# Patient Record
Sex: MALE | Race: WHITE | NOT HISPANIC OR LATINO | Employment: UNEMPLOYED | URBAN - METROPOLITAN AREA
[De-identification: names, ages, dates, MRNs, and addresses within clinical notes are randomized per-mention and may not be internally consistent; named-entity substitution may affect disease eponyms.]

---

## 2019-11-11 ENCOUNTER — APPOINTMENT (OUTPATIENT)
Dept: NON INVASIVE DIAGNOSTICS | Facility: HOSPITAL | Age: 24
DRG: 369 | End: 2019-11-11
Payer: COMMERCIAL

## 2019-11-11 ENCOUNTER — HOSPITAL ENCOUNTER (INPATIENT)
Facility: HOSPITAL | Age: 24
LOS: 1 days | Discharge: OTHER INSTITUTION | DRG: 369 | End: 2019-11-13
Attending: EMERGENCY MEDICINE | Admitting: FAMILY MEDICINE
Payer: COMMERCIAL

## 2019-11-11 ENCOUNTER — APPOINTMENT (EMERGENCY)
Dept: RADIOLOGY | Facility: HOSPITAL | Age: 24
DRG: 369 | End: 2019-11-11
Payer: COMMERCIAL

## 2019-11-11 DIAGNOSIS — K92.0 HEMATEMESIS: ICD-10-CM

## 2019-11-11 DIAGNOSIS — F17.200 TOBACCO DEPENDENCE: ICD-10-CM

## 2019-11-11 DIAGNOSIS — F11.23 OPIOID DEPENDENCE WITH WITHDRAWAL (HCC): Primary | ICD-10-CM

## 2019-11-11 DIAGNOSIS — F11.23 NARCOTIC WITHDRAWAL (HCC): ICD-10-CM

## 2019-11-11 DIAGNOSIS — I47.1 SVT (SUPRAVENTRICULAR TACHYCARDIA) (HCC): ICD-10-CM

## 2019-11-11 PROBLEM — E87.2 HIGH ANION GAP METABOLIC ACIDOSIS: Status: ACTIVE | Noted: 2019-11-11

## 2019-11-11 PROBLEM — R00.0 TACHYCARDIA: Status: ACTIVE | Noted: 2019-11-11

## 2019-11-11 PROBLEM — D72.829 LEUKOCYTOSIS: Status: ACTIVE | Noted: 2019-11-11

## 2019-11-11 LAB
ALBUMIN SERPL BCP-MCNC: 4.8 G/DL (ref 3.5–5)
ALP SERPL-CCNC: 70 U/L (ref 46–116)
ALT SERPL W P-5'-P-CCNC: 33 U/L (ref 12–78)
ANION GAP SERPL CALCULATED.3IONS-SCNC: 17 MMOL/L (ref 4–13)
AST SERPL W P-5'-P-CCNC: 16 U/L (ref 5–45)
ATRIAL RATE: 111 BPM
ATRIAL RATE: 144 BPM
ATRIAL RATE: 156 BPM
ATRIAL RATE: 163 BPM
ATRIAL RATE: 76 BPM
ATRIAL RATE: 87 BPM
BASE EX.OXY STD BLDV CALC-SCNC: 95.5 % (ref 60–80)
BASE EXCESS BLDV CALC-SCNC: 1 MMOL/L
BASOPHILS # BLD AUTO: 0.02 THOUSANDS/ΜL (ref 0–0.1)
BASOPHILS NFR BLD AUTO: 0 % (ref 0–1)
BILIRUB SERPL-MCNC: 0.8 MG/DL (ref 0.2–1)
BUN SERPL-MCNC: 16 MG/DL (ref 5–25)
CALCIUM SERPL-MCNC: 10 MG/DL (ref 8.3–10.1)
CHLORIDE SERPL-SCNC: 102 MMOL/L (ref 100–108)
CO2 SERPL-SCNC: 24 MMOL/L (ref 21–32)
CREAT SERPL-MCNC: 1.01 MG/DL (ref 0.6–1.3)
EOSINOPHIL # BLD AUTO: 0.01 THOUSAND/ΜL (ref 0–0.61)
EOSINOPHIL NFR BLD AUTO: 0 % (ref 0–6)
ERYTHROCYTE [DISTWIDTH] IN BLOOD BY AUTOMATED COUNT: 12.3 % (ref 11.6–15.1)
GFR SERPL CREATININE-BSD FRML MDRD: 104 ML/MIN/1.73SQ M
GLUCOSE SERPL-MCNC: 151 MG/DL (ref 65–140)
HCO3 BLDV-SCNC: 24.4 MMOL/L (ref 24–30)
HCT VFR BLD AUTO: 39.1 % (ref 36.5–49.3)
HCT VFR BLD AUTO: 47.5 % (ref 36.5–49.3)
HGB BLD-MCNC: 13.5 G/DL (ref 12–17)
HGB BLD-MCNC: 16.4 G/DL (ref 12–17)
HOLD SPECIMEN: NORMAL
IMM GRANULOCYTES # BLD AUTO: 0.03 THOUSAND/UL (ref 0–0.2)
IMM GRANULOCYTES NFR BLD AUTO: 0 % (ref 0–2)
LIPASE SERPL-CCNC: 57 U/L (ref 73–393)
LYMPHOCYTES # BLD AUTO: 2.18 THOUSANDS/ΜL (ref 0.6–4.47)
LYMPHOCYTES NFR BLD AUTO: 20 % (ref 14–44)
MAGNESIUM SERPL-MCNC: 1.9 MG/DL (ref 1.6–2.6)
MCH RBC QN AUTO: 29.8 PG (ref 26.8–34.3)
MCHC RBC AUTO-ENTMCNC: 34.5 G/DL (ref 31.4–37.4)
MCV RBC AUTO: 86 FL (ref 82–98)
MONOCYTES # BLD AUTO: 0.66 THOUSAND/ΜL (ref 0.17–1.22)
MONOCYTES NFR BLD AUTO: 6 % (ref 4–12)
NEUTROPHILS # BLD AUTO: 7.83 THOUSANDS/ΜL (ref 1.85–7.62)
NEUTS SEG NFR BLD AUTO: 74 % (ref 43–75)
NRBC BLD AUTO-RTO: 0 /100 WBCS
O2 CT BLDV-SCNC: 19.2 ML/DL
P AXIS: 70 DEGREES
P AXIS: 71 DEGREES
P AXIS: 72 DEGREES
P AXIS: 75 DEGREES
P AXIS: 79 DEGREES
P AXIS: 88 DEGREES
PCO2 BLDV: 35.4 MM HG (ref 42–50)
PH BLDV: 7.46 [PH] (ref 7.3–7.4)
PHOSPHATE SERPL-MCNC: 2.2 MG/DL (ref 2.7–4.5)
PLATELET # BLD AUTO: 286 THOUSANDS/UL (ref 149–390)
PMV BLD AUTO: 8.9 FL (ref 8.9–12.7)
PO2 BLDV: 96.5 MM HG (ref 35–45)
POTASSIUM SERPL-SCNC: 3.5 MMOL/L (ref 3.5–5.3)
PR INTERVAL: 120 MS
PR INTERVAL: 120 MS
PR INTERVAL: 134 MS
PR INTERVAL: 136 MS
PR INTERVAL: 156 MS
PR INTERVAL: 160 MS
PROT SERPL-MCNC: 8 G/DL (ref 6.4–8.2)
QRS AXIS: 100 DEGREES
QRS AXIS: 79 DEGREES
QRS AXIS: 82 DEGREES
QRS AXIS: 85 DEGREES
QRS AXIS: 93 DEGREES
QRS AXIS: 99 DEGREES
QRSD INTERVAL: 104 MS
QRSD INTERVAL: 106 MS
QRSD INTERVAL: 110 MS
QRSD INTERVAL: 94 MS
QRSD INTERVAL: 96 MS
QRSD INTERVAL: 98 MS
QT INTERVAL: 298 MS
QT INTERVAL: 308 MS
QT INTERVAL: 332 MS
QT INTERVAL: 366 MS
QT INTERVAL: 372 MS
QT INTERVAL: 378 MS
QTC INTERVAL: 425 MS
QTC INTERVAL: 447 MS
QTC INTERVAL: 451 MS
QTC INTERVAL: 490 MS
QTC INTERVAL: 496 MS
QTC INTERVAL: 566 MS
RBC # BLD AUTO: 5.5 MILLION/UL (ref 3.88–5.62)
SODIUM SERPL-SCNC: 143 MMOL/L (ref 136–145)
T WAVE AXIS: -15 DEGREES
T WAVE AXIS: 18 DEGREES
T WAVE AXIS: 20 DEGREES
T WAVE AXIS: 23 DEGREES
T WAVE AXIS: 24 DEGREES
T WAVE AXIS: 56 DEGREES
TROPONIN I SERPL-MCNC: <0.02 NG/ML
VENTRICULAR RATE: 111 BPM
VENTRICULAR RATE: 144 BPM
VENTRICULAR RATE: 156 BPM
VENTRICULAR RATE: 163 BPM
VENTRICULAR RATE: 76 BPM
VENTRICULAR RATE: 87 BPM
WBC # BLD AUTO: 10.73 THOUSAND/UL (ref 4.31–10.16)

## 2019-11-11 PROCEDURE — C9113 INJ PANTOPRAZOLE SODIUM, VIA: HCPCS | Performed by: EMERGENCY MEDICINE

## 2019-11-11 PROCEDURE — 85025 COMPLETE CBC W/AUTO DIFF WBC: CPT

## 2019-11-11 PROCEDURE — 96361 HYDRATE IV INFUSION ADD-ON: CPT

## 2019-11-11 PROCEDURE — 94760 N-INVAS EAR/PLS OXIMETRY 1: CPT

## 2019-11-11 PROCEDURE — 83690 ASSAY OF LIPASE: CPT

## 2019-11-11 PROCEDURE — 84484 ASSAY OF TROPONIN QUANT: CPT | Performed by: EMERGENCY MEDICINE

## 2019-11-11 PROCEDURE — 96365 THER/PROPH/DIAG IV INF INIT: CPT

## 2019-11-11 PROCEDURE — 84484 ASSAY OF TROPONIN QUANT: CPT | Performed by: NURSE PRACTITIONER

## 2019-11-11 PROCEDURE — 99220 PR INITIAL OBSERVATION CARE/DAY 70 MINUTES: CPT | Performed by: INTERNAL MEDICINE

## 2019-11-11 PROCEDURE — 85014 HEMATOCRIT: CPT | Performed by: INTERNAL MEDICINE

## 2019-11-11 PROCEDURE — 99203 OFFICE O/P NEW LOW 30 MIN: CPT | Performed by: PHYSICIAN ASSISTANT

## 2019-11-11 PROCEDURE — 93306 TTE W/DOPPLER COMPLETE: CPT | Performed by: INTERNAL MEDICINE

## 2019-11-11 PROCEDURE — 83735 ASSAY OF MAGNESIUM: CPT | Performed by: NURSE PRACTITIONER

## 2019-11-11 PROCEDURE — 80053 COMPREHEN METABOLIC PANEL: CPT

## 2019-11-11 PROCEDURE — 99285 EMERGENCY DEPT VISIT HI MDM: CPT

## 2019-11-11 PROCEDURE — 84100 ASSAY OF PHOSPHORUS: CPT | Performed by: NURSE PRACTITIONER

## 2019-11-11 PROCEDURE — 85018 HEMOGLOBIN: CPT | Performed by: INTERNAL MEDICINE

## 2019-11-11 PROCEDURE — 96375 TX/PRO/DX INJ NEW DRUG ADDON: CPT

## 2019-11-11 PROCEDURE — 71046 X-RAY EXAM CHEST 2 VIEWS: CPT

## 2019-11-11 PROCEDURE — 36415 COLL VENOUS BLD VENIPUNCTURE: CPT

## 2019-11-11 PROCEDURE — 82805 BLOOD GASES W/O2 SATURATION: CPT | Performed by: INTERNAL MEDICINE

## 2019-11-11 PROCEDURE — 93010 ELECTROCARDIOGRAM REPORT: CPT | Performed by: INTERNAL MEDICINE

## 2019-11-11 PROCEDURE — 93005 ELECTROCARDIOGRAM TRACING: CPT

## 2019-11-11 PROCEDURE — 99285 EMERGENCY DEPT VISIT HI MDM: CPT | Performed by: EMERGENCY MEDICINE

## 2019-11-11 PROCEDURE — 96372 THER/PROPH/DIAG INJ SC/IM: CPT

## 2019-11-11 PROCEDURE — 93306 TTE W/DOPPLER COMPLETE: CPT

## 2019-11-11 PROCEDURE — 96376 TX/PRO/DX INJ SAME DRUG ADON: CPT

## 2019-11-11 PROCEDURE — 99244 OFF/OP CNSLTJ NEW/EST MOD 40: CPT | Performed by: INTERNAL MEDICINE

## 2019-11-11 PROCEDURE — 96366 THER/PROPH/DIAG IV INF ADDON: CPT

## 2019-11-11 RX ORDER — METOCLOPRAMIDE HYDROCHLORIDE 5 MG/ML
10 INJECTION INTRAMUSCULAR; INTRAVENOUS EVERY 6 HOURS PRN
Status: DISCONTINUED | OUTPATIENT
Start: 2019-11-11 | End: 2019-11-12

## 2019-11-11 RX ORDER — SODIUM CHLORIDE, SODIUM GLUCONATE, SODIUM ACETATE, POTASSIUM CHLORIDE, MAGNESIUM CHLORIDE, SODIUM PHOSPHATE, DIBASIC, AND POTASSIUM PHOSPHATE .53; .5; .37; .037; .03; .012; .00082 G/100ML; G/100ML; G/100ML; G/100ML; G/100ML; G/100ML; G/100ML
500 INJECTION, SOLUTION INTRAVENOUS ONCE
Status: COMPLETED | OUTPATIENT
Start: 2019-11-11 | End: 2019-11-11

## 2019-11-11 RX ORDER — SODIUM CHLORIDE, SODIUM GLUCONATE, SODIUM ACETATE, POTASSIUM CHLORIDE, MAGNESIUM CHLORIDE, SODIUM PHOSPHATE, DIBASIC, AND POTASSIUM PHOSPHATE .53; .5; .37; .037; .03; .012; .00082 G/100ML; G/100ML; G/100ML; G/100ML; G/100ML; G/100ML; G/100ML
125 INJECTION, SOLUTION INTRAVENOUS CONTINUOUS
Status: DISCONTINUED | OUTPATIENT
Start: 2019-11-11 | End: 2019-11-11

## 2019-11-11 RX ORDER — CHLORPROMAZINE HYDROCHLORIDE 25 MG/ML
25 INJECTION INTRAMUSCULAR ONCE
Status: COMPLETED | OUTPATIENT
Start: 2019-11-11 | End: 2019-11-11

## 2019-11-11 RX ORDER — SODIUM CHLORIDE, SODIUM GLUCONATE, SODIUM ACETATE, POTASSIUM CHLORIDE, MAGNESIUM CHLORIDE, SODIUM PHOSPHATE, DIBASIC, AND POTASSIUM PHOSPHATE .53; .5; .37; .037; .03; .012; .00082 G/100ML; G/100ML; G/100ML; G/100ML; G/100ML; G/100ML; G/100ML
50 INJECTION, SOLUTION INTRAVENOUS CONTINUOUS
Status: DISCONTINUED | OUTPATIENT
Start: 2019-11-11 | End: 2019-11-12

## 2019-11-11 RX ORDER — SODIUM CHLORIDE, SODIUM GLUCONATE, SODIUM ACETATE, POTASSIUM CHLORIDE, MAGNESIUM CHLORIDE, SODIUM PHOSPHATE, DIBASIC, AND POTASSIUM PHOSPHATE .53; .5; .37; .037; .03; .012; .00082 G/100ML; G/100ML; G/100ML; G/100ML; G/100ML; G/100ML; G/100ML
1000 INJECTION, SOLUTION INTRAVENOUS ONCE
Status: COMPLETED | OUTPATIENT
Start: 2019-11-11 | End: 2019-11-11

## 2019-11-11 RX ORDER — LORAZEPAM 2 MG/ML
1 INJECTION INTRAMUSCULAR ONCE
Status: COMPLETED | OUTPATIENT
Start: 2019-11-11 | End: 2019-11-11

## 2019-11-11 RX ORDER — SODIUM CHLORIDE, SODIUM GLUCONATE, SODIUM ACETATE, POTASSIUM CHLORIDE, MAGNESIUM CHLORIDE, SODIUM PHOSPHATE, DIBASIC, AND POTASSIUM PHOSPHATE .53; .5; .37; .037; .03; .012; .00082 G/100ML; G/100ML; G/100ML; G/100ML; G/100ML; G/100ML; G/100ML
125 INJECTION, SOLUTION INTRAVENOUS CONTINUOUS
Status: DISPENSED | OUTPATIENT
Start: 2019-11-11 | End: 2019-11-11

## 2019-11-11 RX ORDER — METOCLOPRAMIDE HYDROCHLORIDE 5 MG/ML
10 INJECTION INTRAMUSCULAR; INTRAVENOUS ONCE
Status: COMPLETED | OUTPATIENT
Start: 2019-11-11 | End: 2019-11-11

## 2019-11-11 RX ORDER — DIPHENHYDRAMINE HYDROCHLORIDE 50 MG/ML
25 INJECTION INTRAMUSCULAR; INTRAVENOUS ONCE
Status: COMPLETED | OUTPATIENT
Start: 2019-11-11 | End: 2019-11-11

## 2019-11-11 RX ORDER — METOPROLOL TARTRATE 5 MG/5ML
5 INJECTION INTRAVENOUS EVERY 6 HOURS PRN
Status: DISCONTINUED | OUTPATIENT
Start: 2019-11-11 | End: 2019-11-13 | Stop reason: HOSPADM

## 2019-11-11 RX ORDER — LORAZEPAM 2 MG/ML
1 INJECTION INTRAMUSCULAR EVERY 4 HOURS PRN
Status: DISCONTINUED | OUTPATIENT
Start: 2019-11-11 | End: 2019-11-12

## 2019-11-11 RX ORDER — PANTOPRAZOLE SODIUM 40 MG/1
40 INJECTION, POWDER, FOR SOLUTION INTRAVENOUS ONCE
Status: COMPLETED | OUTPATIENT
Start: 2019-11-11 | End: 2019-11-11

## 2019-11-11 RX ORDER — NICOTINE 21 MG/24HR
1 PATCH, TRANSDERMAL 24 HOURS TRANSDERMAL DAILY
Status: DISCONTINUED | OUTPATIENT
Start: 2019-11-11 | End: 2019-11-13 | Stop reason: HOSPADM

## 2019-11-11 RX ORDER — PANTOPRAZOLE SODIUM 40 MG/1
40 INJECTION, POWDER, FOR SOLUTION INTRAVENOUS EVERY 12 HOURS SCHEDULED
Status: DISCONTINUED | OUTPATIENT
Start: 2019-11-11 | End: 2019-11-11

## 2019-11-11 RX ADMIN — METOCLOPRAMIDE HYDROCHLORIDE 10 MG: 5 INJECTION INTRAMUSCULAR; INTRAVENOUS at 20:01

## 2019-11-11 RX ADMIN — NICOTINE 1 PATCH: 21 PATCH TRANSDERMAL at 08:13

## 2019-11-11 RX ADMIN — SODIUM CHLORIDE, SODIUM GLUCONATE, SODIUM ACETATE, POTASSIUM CHLORIDE, MAGNESIUM CHLORIDE, SODIUM PHOSPHATE, DIBASIC, AND POTASSIUM PHOSPHATE 125 ML/HR: .53; .5; .37; .037; .03; .012; .00082 INJECTION, SOLUTION INTRAVENOUS at 06:18

## 2019-11-11 RX ADMIN — PANTOPRAZOLE SODIUM 40 MG: 40 INJECTION, POWDER, FOR SOLUTION INTRAVENOUS at 03:22

## 2019-11-11 RX ADMIN — SODIUM CHLORIDE, SODIUM GLUCONATE, SODIUM ACETATE, POTASSIUM CHLORIDE AND MAGNESIUM CHLORIDE 125 ML/HR: 526; 502; 368; 37; 30 INJECTION, SOLUTION INTRAVENOUS at 04:52

## 2019-11-11 RX ADMIN — FAMOTIDINE 20 MG: 10 INJECTION, SOLUTION INTRAVENOUS at 22:41

## 2019-11-11 RX ADMIN — CHLORPROMAZINE HYDROCHLORIDE 25 MG: 25 INJECTION INTRAMUSCULAR at 03:22

## 2019-11-11 RX ADMIN — SODIUM CHLORIDE 1000 ML: 0.9 INJECTION, SOLUTION INTRAVENOUS at 02:03

## 2019-11-11 RX ADMIN — SODIUM CHLORIDE, SODIUM GLUCONATE, SODIUM ACETATE, POTASSIUM CHLORIDE AND MAGNESIUM CHLORIDE 1000 ML: 526; 502; 368; 37; 30 INJECTION, SOLUTION INTRAVENOUS at 03:23

## 2019-11-11 RX ADMIN — DIPHENHYDRAMINE HYDROCHLORIDE 25 MG: 50 INJECTION, SOLUTION INTRAMUSCULAR; INTRAVENOUS at 02:02

## 2019-11-11 RX ADMIN — LORAZEPAM 1 MG: 2 INJECTION INTRAMUSCULAR; INTRAVENOUS at 02:01

## 2019-11-11 RX ADMIN — METOCLOPRAMIDE 10 MG: 5 INJECTION, SOLUTION INTRAMUSCULAR; INTRAVENOUS at 02:03

## 2019-11-11 RX ADMIN — LORAZEPAM 1 MG: 2 INJECTION INTRAMUSCULAR; INTRAVENOUS at 20:11

## 2019-11-11 RX ADMIN — METOPROLOL TARTRATE 12.5 MG: 25 TABLET ORAL at 22:41

## 2019-11-11 RX ADMIN — SODIUM CHLORIDE, SODIUM GLUCONATE, SODIUM ACETATE, POTASSIUM CHLORIDE, MAGNESIUM CHLORIDE, SODIUM PHOSPHATE, DIBASIC, AND POTASSIUM PHOSPHATE 500 ML: .53; .5; .37; .037; .03; .012; .00082 INJECTION, SOLUTION INTRAVENOUS at 08:41

## 2019-11-11 RX ADMIN — LORAZEPAM 1 MG: 2 INJECTION INTRAMUSCULAR; INTRAVENOUS at 03:21

## 2019-11-11 RX ADMIN — SODIUM CHLORIDE, SODIUM GLUCONATE, SODIUM ACETATE, POTASSIUM CHLORIDE AND MAGNESIUM CHLORIDE 50 ML/HR: 526; 502; 368; 37; 30 INJECTION, SOLUTION INTRAVENOUS at 18:11

## 2019-11-11 RX ADMIN — METOPROLOL TARTRATE 12.5 MG: 25 TABLET ORAL at 11:33

## 2019-11-11 RX ADMIN — METOPROLOL TARTRATE 5 MG: 5 INJECTION INTRAVENOUS at 08:30

## 2019-11-11 NOTE — ASSESSMENT & PLAN NOTE
Mild, noted on initial CBC  Suspect this is likely reactive due to withdrawal, nausea and vomiting    · Monitor for sign of infection  · Monitor temperature, white blood cell count  · See plan for opioid withdrawal and hematemesis as otherwise outlined

## 2019-11-11 NOTE — ASSESSMENT & PLAN NOTE
Patient was receiving detox treatment at [facility] (UNIT 6 AND 7)  In the ED, required Benadryl, Reglan, Ativan, and Thorazine  · Will consult toxicology and follow-up recommendations  · Monitor for continued sign of withdrawal  · Reglan p r n  · Ativan p r n

## 2019-11-11 NOTE — CONSULTS
Consultation - [facility]) [facility]  Jose Foley 22 y o  male MRN: [id number]  Unit/Bed#: ED 23 Encounter: [id number]        Consults    Reason for Consult / Principal Problem: Hematemesis    HPI: Mr Bill Morales is a 23 yo M with a PMH of opioid addiction currently going through detox who presented yesterday with acute onset of abdominal pain, N/V and then with hematemesis  This history of obtained through the chart as the patient is currently sedated given his withdrawal and cannot provide subjective history  His RN reports no vomiting since her shift started early this AM  His Hb is stable  There is no family at bedside  He is having episodes of SVT for which cardiology is seeing him  REVIEW OF SYSTEMS: Negative except for as stated above      Historical Information   History reviewed  No pertinent past medical history  History reviewed  No pertinent surgical history  Social History   Social History     Substance and Sexual Activity   Alcohol Use Not Currently     Social History     Substance and Sexual Activity   Drug Use Yes    Comment: day three detox from fentanyl      Social History     Tobacco Use   Smoking Status Current Every Day Smoker    Packs/day: 1 00    Types: Cigarettes   Smokeless Tobacco Never Used     No family history on file      Meds/Allergies       (Not in a hospital admission)  Current Facility-Administered Medications   Medication Dose Route Frequency    LORazepam (ATIVAN) 2 mg/mL injection 1 mg  1 mg Intravenous Q4H PRN    metoclopramide (REGLAN) injection 10 mg  10 mg Intravenous Q6H PRN    metoprolol (LOPRESSOR) injection 5 mg  5 mg Intravenous Q6H PRN    metoprolol tartrate (LOPRESSOR) tablet 12 5 mg  12 5 mg Oral Q12H GLORIA    multi-electrolyte (PLASMALYTE-A/ISOLYTE-S PH 7 4) IV solution  125 mL/hr Intravenous Continuous    nicotine (NICODERM CQ) 21 mg/24 hr TD 24 hr patch 1 patch  1 patch Transdermal Daily    pantoprazole (PROTONIX) injection 40 mg  40 mg Intravenous Q12H [facility]       No Known Allergies        Objective     Blood pressure 117/75, pulse 88, temperature 98 3 °F (36 8 °C), temperature source Oral, resp  rate 17, height 5' 10" (1 778 m), weight 65 3 kg (143 lb 15 4 oz), SpO2 99 %  Intake/Output Summary (Last 24 hours) at 11/11/2019 1331  Last data filed at 11/11/2019 0617  Gross per 24 hour   Intake 1177 08 ml   Output --   Net 1177 08 ml         PHYSICAL EXAM:      General Appearance:   Asleep/sedated, no acute distress    HEENT:   Normocephalic, atraumatic, anicteric      Neck:  Supple, symmetrical, trachea midline, no adenopathy;    thyroid: no enlargement/tenderness/nodules; no carotid  bruit or JVD    Lungs:   Clear to auscultation bilaterally; no rales, rhonchi or wheezing; respirations unlabored    Heart[de-identified]   RRR, no murmur   Abdomen:   Soft, non-tender, non-distended; normal bowel sounds; no masses, no organomegaly    Rectal:   Deferred    Extremities:  No cyanosis, clubbing or edema    Pulses:  2+ and symmetric all extremities    Skin:  No jaundice or pallor, no rashes or lesions      Lab Results:   Results from last 7 days   Lab Units 11/11/19  0817 11/11/19  0156   WBC Thousand/uL  --  10 73*   HEMOGLOBIN g/dL 13 5 16 4   HEMATOCRIT % 39 1 47 5   PLATELETS Thousands/uL  --  286   NEUTROS PCT %  --  74   LYMPHS PCT %  --  20   MONOS PCT %  --  6   EOS PCT %  --  0     Results from last 7 days   Lab Units 11/11/19  0156   POTASSIUM mmol/L 3 5   CHLORIDE mmol/L 102   CO2 mmol/L 24   BUN mg/dL 16   CREATININE mg/dL 1 01   CALCIUM mg/dL 10 0   ALK PHOS U/L 70   ALT U/L 33   AST U/L 16         Results from last 7 days   Lab Units 11/11/19  0156   LIPASE u/L 57*       Imaging Studies: I have personally reviewed pertinent imaging studies  Xr Chest 2 Views  Result Date: 11/11/2019  Impression: No acute cardiopulmonary disease        ASSESSMENT and PLAN:      Hematemesis  - Reported hematemesis on admission after acute onset of epigastric pain, N/V likely in the setting of opioid withdrawal as outpatient  - Hb stable at 13 5, relatively HD stable without hypotension, BUN not elevated  - NPO  - Protonix 40 mg IV BID  - Can consider EGD in the next 24 hours after stabilized from a withdrawal standpoint  - Supportive care  - Antiemetics PRN      The patient will be seen and examined by Dr Amari Harden

## 2019-11-11 NOTE — ED PROVIDER NOTES
History  Chief Complaint   Patient presents with    Vomiting     Vomitting since 1930 last night given 4mg zofran without relief, pt on day 3 detox of fentanyl from [place], pt repots vomitting blood, abd pain denies diarrhea  61-year-old male presents from drug recovery with 3 days of persistent vomiting and inability to tolerate oral intake  Patient notes abdominal pain with vomiting, denies any at rest   The patient states this is in the epigastrium that he describes as a cramping pain with the recurrent vomiting  Patient was given ondansetron at his recovery Center without improvement  Per the limited information available from the facility, patient was also receiving clonidine but no additional medication and no benzodiazepines were noted  Patient notes he has had multiple episodes similar to this previously during detoxification  Impression and plan:  Vomiting with a broad differential   Patient does note occasional episodes of dark brown vomiting  Considering this, will obtain laboratory evaluation and chest x-ray  Likely secondary to Maame-Quijano tear  Will treat patient symptomatically, monitor, and reassess  History provided by:  Patient  Vomiting   Severity:  Moderate  Timing:  Constant  Progression:  Unchanged  Chronicity:  New  Recent urination:  Normal  Relieved by:  Nothing  Worsened by:  Food smell and liquids  Ineffective treatments:  Antiemetics  Associated symptoms: abdominal pain    Associated symptoms: no arthralgias, no chills, no cough, no diarrhea, no fever, no headaches, no myalgias, no sore throat and no URI        None       History reviewed  No pertinent past medical history  History reviewed  No pertinent surgical history  No family history on file  I have reviewed and agree with the history as documented      Social History     Tobacco Use    Smoking status: Current Every Day Smoker     Packs/day: 1 00     Types: Cigarettes    Smokeless tobacco: Never Used   Substance Use Topics    Alcohol use: Not Currently    Drug use: Yes     Comment: day three detox from fentanyl         Review of Systems   Constitutional: Negative for chills and fever  HENT: Negative for sore throat  Respiratory: Negative for cough  Gastrointestinal: Positive for abdominal pain and vomiting  Negative for diarrhea  Musculoskeletal: Negative for arthralgias and myalgias  Neurological: Negative for headaches  All other systems reviewed and are negative  Physical Exam  Physical Exam   Constitutional: He appears well-developed and well-nourished  No distress  HENT:   Head: Normocephalic and atraumatic  Mouth/Throat: Oropharynx is clear and moist    Eyes: Pupils are equal, round, and reactive to light  EOM are normal    Neck: Normal range of motion  Neck supple  Cardiovascular: Normal rate and regular rhythm  Negative Hamman's crunch   Pulmonary/Chest: Effort normal and breath sounds normal  He exhibits no tenderness  No crepitus  Abdominal: Soft  Bowel sounds are normal  He exhibits no distension  There is no tenderness  There is no rebound and no guarding  Musculoskeletal: He exhibits no edema or tenderness  Neurological: He is alert  Skin: Skin is warm and dry  He is not diaphoretic  Psychiatric: He has a normal mood and affect  Vitals reviewed        Vital Signs  ED Triage Vitals   Temperature Pulse Respirations Blood Pressure SpO2   11/11/19 0150 11/11/19 0150 11/11/19 0150 11/11/19 0150 11/11/19 0150   99 4 °F (37 4 °C) (!) 108 20 140/81 95 %      Temp Source Heart Rate Source Patient Position - Orthostatic VS BP Location FiO2 (%)   11/11/19 0150 11/11/19 0150 11/11/19 0200 11/11/19 0150 --   Axillary Monitor Sitting Left arm       Pain Score       11/11/19 0150       7           Vitals:    11/11/19 2118 11/11/19 2344 11/12/19 0029 11/12/19 0251   BP:  142/88  127/83   Pulse: 88 83 73 92   Patient Position - Orthostatic VS:             Visual Acuity  Visual Acuity      Most Recent Value   L Pupil Size (mm)  3   R Pupil Size (mm)  3   L Pupil Shape  Round   R Pupil Shape  Round          ED Medications  Medications   multi-electrolyte (PLASMALYTE-A/ISOLYTE-S PH 7 4) IV solution (0 mL/hr Intravenous Stopped 11/11/19 1645)   metoclopramide (REGLAN) injection 10 mg (10 mg Intravenous Given 11/12/19 0256)   nicotine (NICODERM CQ) 21 mg/24 hr TD 24 hr patch 1 patch (1 patch Transdermal Medication Applied 11/11/19 0813)   metoprolol (LOPRESSOR) injection 5 mg (5 mg Intravenous Given 11/11/19 0830)   LORazepam (ATIVAN) 2 mg/mL injection 1 mg (1 mg Intravenous Given 11/12/19 0301)   metoprolol tartrate (LOPRESSOR) tablet 12 5 mg (12 5 mg Oral Given 11/11/19 2241)   famotidine (PEPCID) injection 20 mg (20 mg Intravenous Given 11/11/19 2241)   multi-electrolyte (PLASMALYTE-A/ISOLYTE-S PH 7 4) IV solution (50 mL/hr Intravenous New Bag 11/11/19 1811)   sodium chloride 0 9 % bolus 1,000 mL (0 mL Intravenous Stopped 11/11/19 0303)   LORazepam (ATIVAN) 2 mg/mL injection 1 mg (1 mg Intravenous Given 11/11/19 0201)   metoclopramide (REGLAN) injection 10 mg (10 mg Intravenous Given 11/11/19 0203)   diphenhydrAMINE (BENADRYL) injection 25 mg (25 mg Intravenous Given 11/11/19 0202)   multi-electrolyte (ISOLYTE-S PH 7 4) bolus 1,000 mL (0 mL Intravenous Stopped 11/11/19 0423)   pantoprazole (PROTONIX) injection 40 mg (40 mg Intravenous Given 11/11/19 0322)   chlorproMAZINE (THORAZINE) injection SOLN 25 mg (25 mg Intramuscular Given 11/11/19 0322)   LORazepam (ATIVAN) 2 mg/mL injection 1 mg (1 mg Intravenous Given 11/11/19 0321)   multi-electrolyte (ISOLYTE-S PH 7 4) bolus 500 mL (0 mL Intravenous Stopped 11/11/19 0941)       Diagnostic Studies  Results Reviewed     Procedure Component Value Units Date/Time    CBC [[id number]]  (Normal) Collected:  11/12/19 0532    Lab Status:  Final result Specimen:  Blood from Arm, Left Updated:  11/12/19 0551     WBC 9 38 Thousand/uL      RBC 4 58 Million/uL      Hemoglobin 13 7 g/dL      Hematocrit 40 4 %      MCV 88 fL      MCH 29 9 pg      MCHC 33 9 g/dL      RDW 12 3 %      Platelets 196 Thousands/uL      MPV 9 2 fL     Comprehensive metabolic panel [[id number]] Collected:  11/12/19 0532    Lab Status: In process Specimen:  Blood from Arm, Left Updated:  11/12/19 0541    Phosphorus [[id number]] Collected:  11/12/19 0532    Lab Status: In process Specimen:  Blood from Arm, Left Updated:  11/12/19 0541    Magnesium [[id number]] Collected:  11/12/19 0532    Lab Status:   In process Specimen:  Blood from Arm, Left Updated:  11/12/19 0541    Troponin I [[id number]]  (Normal) Collected:  11/11/19 1225    Lab Status:  Final result Specimen:  Blood from Arm, Left Updated:  11/11/19 1249     Troponin I <0 02 ng/mL     Troponin I [[id number]]  (Normal) Collected:  11/11/19 0946    Lab Status:  Final result Specimen:  Blood from Arm, Right Updated:  11/11/19 1011     Troponin I <0 02 ng/mL     Magnesium [[id number]]  (Normal) Collected:  11/11/19 0156    Lab Status:  Final result Specimen:  Blood from Arm, Right Updated:  11/11/19 0913     Magnesium 1 9 mg/dL     Phosphorus [[id number]]  (Abnormal) Collected:  11/11/19 0156    Lab Status:  Final result Specimen:  Blood from Arm, Right Updated:  11/11/19 0913     Phosphorus 2 2 mg/dL     Hemoglobin and hematocrit, blood [[id number]]  (Normal) Collected:  11/11/19 0817    Lab Status:  Final result Specimen:  Blood from Arm, Right Updated:  11/11/19 0824     Hemoglobin 13 5 g/dL      Hematocrit 39 1 %     Blood gas, venous [[id number]]  (Abnormal) Collected:  11/11/19 0756    Lab Status:  Final result Specimen:  Blood from Arm, Right Updated:  11/11/19 0802     pH, Abel 7 457     pCO2, Abel 35 4 mm Hg      pO2, Abel 96 5 mm Hg      HCO3, Abel 24 4 mmol/L      Base Excess, Abel 1 0 mmol/L      O2 Content, Abel 19 2 ml/dL      O2 HGB, VENOUS 95 5 %     Troponin I [[id number]]  (Normal) Collected:  11/11/19 0451    Lab Status: Final result Specimen:  Blood from Arm, Right Updated:  11/11/19 0523     Troponin I <0 02 ng/mL     Comprehensive metabolic panel [[id number]]  (Abnormal) Collected:  11/11/19 0156    Lab Status:  Final result Specimen:  Blood from Arm, Right Updated:  11/11/19 0225     Sodium 143 mmol/L      Potassium 3 5 mmol/L      Chloride 102 mmol/L      CO2 24 mmol/L      ANION GAP 17 mmol/L      BUN 16 mg/dL      Creatinine 1 01 mg/dL      Glucose 151 mg/dL      Calcium 10 0 mg/dL      AST 16 U/L      ALT 33 U/L      Alkaline Phosphatase 70 U/L      Total Protein 8 0 g/dL      Albumin 4 8 g/dL      Total Bilirubin 0 80 mg/dL      eGFR 104 ml/min/1 73sq m     Narrative:       National Kidney Disease Foundation guidelines for Chronic Kidney Disease (CKD):     Stage 1 with normal or high GFR (GFR > 90 mL/min/1 73 square meters)    Stage 2 Mild CKD (GFR = 60-89 mL/min/1 73 square meters)    Stage 3A Moderate CKD (GFR = 45-59 mL/min/1 73 square meters)    Stage 3B Moderate CKD (GFR = 30-44 mL/min/1 73 square meters)    Stage 4 Severe CKD (GFR = 15-29 mL/min/1 73 square meters)    Stage 5 End Stage CKD (GFR <15 mL/min/1 73 square meters)  Note: GFR calculation is accurate only with a steady state creatinine    Lipase [[id number]]  (Abnormal) Collected:  11/11/19 0156    Lab Status:  Final result Specimen:  Blood from Arm, Right Updated:  11/11/19 0225     Lipase 57 u/L     CBC and differential [[id number]]  (Abnormal) Collected:  11/11/19 0156    Lab Status:  Final result Specimen:  Blood from Arm, Right Updated:  11/11/19 0206     WBC 10 73 Thousand/uL      RBC 5 50 Million/uL      Hemoglobin 16 4 g/dL      Hematocrit 47 5 %      MCV 86 fL      MCH 29 8 pg      MCHC 34 5 g/dL      RDW 12 3 %      MPV 8 9 fL      Platelets 029 Thousands/uL      nRBC 0 /100 WBCs      Neutrophils Relative 74 %      Immat GRANS % 0 %      Lymphocytes Relative 20 %      Monocytes Relative 6 %      Eosinophils Relative 0 %      Basophils Relative 0 %      Neutrophils Absolute 7 83 Thousands/µL      Immature Grans Absolute 0 03 Thousand/uL      Lymphocytes Absolute 2 18 Thousands/µL      Monocytes Absolute 0 66 Thousand/µL      Eosinophils Absolute 0 01 Thousand/µL      Basophils Absolute 0 02 Thousands/µL                  XR chest 2 views   ED Interpretation by Kelle Woodall MD (11/11 0250)   No acute findings  No pneumomediastinum  Final Result by Latricia Cueto MD (11/11 6510)      No acute cardiopulmonary disease  Findings concur with the preliminary report by the referring clinician already in PACS and/or our electronic record EPIC  Workstation performed: [id number]                    Procedures  Procedures       ED Course  ED Course as of Nov 12 0556   [facility] Nov 11, 2019   4075 EKG demonstrates NSR   QTC normal   S1Q3T3 present but no CP or dyspnea  7890 Patient reassessed  Patient states abdominal pain has resolved, no additional episodes of vomiting  Subsequent to my reassessment, patient with sudden onset of tachycardia into the 150s  Patient reassessed again, continues to deny any chest pain or abdominal pain at present  This occurred shortly after awakening the patient saw unclear if this is secondary to withdrawal symptoms  Patient with EKG with significant QTC prolongation out of 566 though unclear if this is secondary to rate so will repeat  Patient's rate improving immediately after reassessment by placing patient in a dark room  Will admit patient for continued monitoring and re-evaluation  Patient's hematemesis likely secondary to Maame-Quijano tear however considering limited information regarding his history, will have GI consult considering risk factor for tachycardia  Patient's hemoglobin and BUN are normal       0443 Repeat EKG with persistent task sinus tachycardia but improved rate of 111  Patient's QTC resolved at 0451 hours    There are multiple leads with T-wave inversion, the clinical significance of which is unclear considering patient has no chest pain considering possible on reliability of history, will send troponin monitoring  5157 Patient with repeat episode of significant tachycardia  Patient notes nursing that he had chest pain however patient denies this to myself  Repeat EKG again with sinus tachycardia with a rate of 156  Patient's heart rate again improved following repeat reassurance and supportive measures  Consider repeat episode, discussed with ICU doctor Michal Barthel who feels patient is appropriate for the floor  MDM    Disposition  Final diagnoses:   Hematemesis   Narcotic withdrawal ([address] )     Time reflects when diagnosis was documented in both MDM as applicable and the Disposition within this note     Time User Action Codes Description Comment    11/11/2019  5:17 AM Lexie Carbine D Add [F11 23] Opioid dependence with withdrawal ([facility] [address] )     11/11/2019  5:17 AM Lexie Carbine D Modify [F11 23] Opioid dependence with withdrawal ([address] )     11/11/2019  6:07 AM Lubertha Cedar Add [K92 0] Hematemesis     11/11/2019  6:08 AM Lubertha [place] Add [F11 23] Narcotic withdrawal ([facility] [address] )     11/11/2019  8:02 AM Berenice Dhaliwal Add [I47 1] SVT (supraventricular tachycardia) [facility])       ED Disposition     ED Disposition Condition Date/Time Comment    Admit Stable Mon Nov 11, 2019  6:08 AM Case was discussed with BERTHA and the patient's admission status was agreed to be Admission Status: observation status to the service of Dr Jin Bronw   Follow-up Information    None         There are no discharge medications for this patient  No discharge procedures on file      ED Provider  Electronically Signed by           Yasmin Rodriguez MD  11/12/19 4413

## 2019-11-11 NOTE — ED NOTES
1  Chief Complaint Vomiting x 3 days detox  from  Fentanyl, from [place]  2  Orientation Status AOx3  3  Abnormal Labs, tests, vitals     NPO will be changing to clear liquids soon per Dr Arti Muller    4  Important drips  5  Time of last narcotics  6  IV lines, drains, tubes 20RAC 18 LAC  7  Isolation status  8  Skin Check  9  Ambulation Status amy muniz  10  ED RN phone number Aaron Mendez [phone]  11      Debo Melgar RN  13  11/11/19 5981  1 [facility] EB Melgar  11/11/19 1636

## 2019-11-11 NOTE — ASSESSMENT & PLAN NOTE
Suspect due to Maame-Quijano tear  Currently appears to be resolved  · NPO  · Continue IV fluids  · Continue IV Protonix  · Reglan p r n    · Repeat hemoglobin and hematocrit  · Consult Gastroenterology

## 2019-11-11 NOTE — ASSESSMENT & PLAN NOTE
In the ED, patient was having repeated bouts of tachycardia, at times with heart rate into the 150s  This was spontaneously resolved  He has lately been trending in the 100s  Suspect this is due to opioid withdrawal and stress from persistent hematemesis    · Continue telemetry monitoring for now  · EKG and troponin if experiencing chest pain or shortness of breath  · See treatment for opioid withdrawal as outlined  · Can try IV metoprolol or Cardizem if persistent

## 2019-11-11 NOTE — ASSESSMENT & PLAN NOTE
· Will follow up VBG  · Continue isolyte hydration  · Monitor serum electrolytes  · Monitor for resolution

## 2019-11-11 NOTE — CONSULTS
Consultation - Cardiology Team One  Brian Abdul 22 y o  male MRN: [id number]  Unit/Bed#: ED 23 Encounter: [id number]    Consults    Physician Requesting Consult: Prema Barth MD  Reason for Consult / Principal Problem:  SVT    Assessment/Plan:    1  Atrial tachycardia versus sinus tachycardia  -suspected SVT on telemetry, EKG only captured atrial tachycardia versus sinus tachycardia  -likely reactive to opioid withdrawal and persistent nausea and vomiting  -echocardiogram ordered and pending  -started on metoprolol tartrate 12 5 mg b i d   -continue to monitor telemetry    2  Opioid withdrawal  -continue care per [facility] and toxicology      HPI: Cardiologist Dr Justice Noonan is a 22y o  year old male who has a history of fentanyl abuse with recent admission to rehabilitation facility and tobacco abuse who presents to the emergency room last evening with complaints of blood in his vomit  Patient has been at a rehab facility since Thursday or Friday and was being treated for opioid withdrawal   He he had vomiting and abdominal pain that started Saturday evening was being treated with Zofran without any improvement  He was noted last evening to have blood in his vomit, therefore he came to the emergency room further evaluation  While he was in the emergency room he was noted to have what looked like SVT  EKG done around that time only showed what looks like atrial tachycardia versus sinus tachycardia         REVIEW OF SYSTEMS:  Constitutional:  Denies fever or chills   Eyes:  Denies change in visual acuity   HENT:  Denies nasal congestion or sore throat   Respiratory:  Denies cough or shortness of breath   Cardiovascular:  Denies chest pain or edema   GI:  Denies abdominal pain, nausea, vomiting, bloody stools or diarrhea   :  Denies dysuria, frequency, difficulty in micturition and nocturia  Musculoskeletal:  Denies back pain or joint pain   Neurologic:  Denies headache, focal weakness or sensory changes   Endocrine:  Denies polyuria or polydipsia   Lymphatic:  Denies swollen glands   Psychiatric:  Denies depression or anxiety     Historical Information   History reviewed  No pertinent past medical history  History reviewed  No pertinent surgical history  Social History     Substance and Sexual Activity   Alcohol Use Not Currently     Social History     Substance and Sexual Activity   Drug Use Yes    Comment: day three detox from fentanyl      Social History     Tobacco Use   Smoking Status Current Every Day Smoker    Packs/day: 1 00    Types: Cigarettes   Smokeless Tobacco Never Used       Family History: No family history on file  MEDS & ALLERGIES:  all current active meds have been reviewed and current meds: Current Facility-Administered Medications   Medication Dose Route Frequency    LORazepam (ATIVAN) 2 mg/mL injection 1 mg  1 mg Intravenous Q4H PRN    metoclopramide (REGLAN) injection 10 mg  10 mg Intravenous Q6H PRN    metoprolol (LOPRESSOR) injection 5 mg  5 mg Intravenous Q6H PRN    metoprolol tartrate (LOPRESSOR) tablet 12 5 mg  12 5 mg Oral Q12H [facility]    multi-electrolyte (PLASMALYTE-A/ISOLYTE-S PH 7 4) IV solution  125 mL/hr Intravenous Continuous    nicotine (NICODERM CQ) 21 mg/24 hr TD 24 hr patch 1 patch  1 patch Transdermal Daily    pantoprazole (PROTONIX) injection 40 mg  40 mg Intravenous Q12H [facility]       multi-electrolyte 125 mL/hr Last Rate: 125 mL/hr (11/11/19 0618)     No Known Allergies    OBJECTIVE:  Vitals:   Vitals:    11/11/19 1230   BP: 117/75   Pulse: 88   Resp: 17   Temp:    SpO2: 99%     Body mass index is 20 66 kg/m²      Systolic (69ODS), TDC:747 , Min:106 , RQM:156     Diastolic (27ECT), ZGS:32, Min:56, Max:92      Intake/Output Summary (Last 24 hours) at 11/11/2019 1334  Last data filed at 11/11/2019 0617  Gross per 24 hour   Intake 1177 08 ml   Output --   Net 1177 08 ml     Weight (last 2 days)     Date/Time   Weight    11/11/19 0150 65 3 (143 96)            Invasive Devices     Peripheral Intravenous Line            Peripheral IV 11/11/19 Left Antecubital less than 1 day    Peripheral IV 11/11/19 Right Antecubital less than 1 day                PHYSICAL EXAMS:  General:  Patient is not in acute distress, laying in the bed comfortably, awake, alert responding to commands  Head: Normocephalic, Atraumatic     HEENT: White sclera, pink conjunctiva  Neck:  Supple, no neck vein distention  Respiratory: clear to P/A  Cardiovascular:  PMI normal, S1-S2 normal, No  Murmurs, thrills, gallops, rubs, regular rhythm  GI:  Abdomen soft, non-tender, non-distended  Extremities: No edema, normal pulses  Integument:  No skin rashes or ulceration  Lymphatic:  No cervical or inguinal lymphadenopathy  Neurologic:  Patient is awake alert, responding to command, oriented to person, place and time     LABORATORY RESULTS:  Results from last 7 days   Lab Units 11/11/19  1225 11/11/19  0946 11/11/19  0451   TROPONIN I ng/mL <0 02 <0 02 <0 02     CBC with diff: Results from last 7 days   Lab Units 11/11/19  0817 11/11/19  0156   WBC Thousand/uL  --  10 73*   HEMOGLOBIN g/dL 13 5 16 4   HEMATOCRIT % 39 1 47 5   MCV fL  --  86   PLATELETS Thousands/uL  --  286   MCH pg  --  29 8   MCHC g/dL  --  34 5   RDW %  --  12 3   MPV fL  --  8 9   NRBC AUTO /100 WBCs  --  0       CMP:  Results from last 7 days   Lab Units 11/11/19  0156   POTASSIUM mmol/L 3 5   CHLORIDE mmol/L 102   CO2 mmol/L 24   BUN mg/dL 16   CREATININE mg/dL 1 01   CALCIUM mg/dL 10 0   AST U/L 16   ALT U/L 33   ALK PHOS U/L 70   EGFR ml/min/1 73sq m 104       BMP:  Results from last 7 days   Lab Units 11/11/19  0156   POTASSIUM mmol/L 3 5   CHLORIDE mmol/L 102   CO2 mmol/L 24   BUN mg/dL 16   CREATININE mg/dL 1 01   CALCIUM mg/dL 10 0            Results from last 7 days   Lab Units 11/11/19  0156   MAGNESIUM mg/dL 1 9                   Lipid Profile:   No results found for: CHOL  No results found for: HDL  No results found for: [address]  No results found for: TRIG    Cardiac testing:   No results found for this or any previous visit  No results found for this or any previous visit  No procedure found  No results found for this or any previous visit  Imaging:   I have personally reviewed pertinent reports  EKG reviewed personally:  Sinus tachycardia and sinus rhythm    Telemetry reviewed personally:   Sinus rhythm currently with a rate the 70s      Code Status: Level 1 - Full Code    Counseling / Coordination of Care  Total floor / unit time spent today 15 minutes  Greater than 50% of total time was spent with the patient and / or family counseling and / or coordination of care  A description of the counseling / coordination of care: Review of history, current assessment, development of a plan      [facility]  11/11/2019,1:34 PM

## 2019-11-11 NOTE — H&P
H&P- Rj Blanco 1995, 22 y o  male MRN: [id number]    Unit/Bed#: ED 19 Encounter: [id number]    Primary Care Provider: No primary care provider on file  Date and time admitted to hospital: 11/11/2019  1:41 AM        * Hematemesis  Assessment & Plan  Suspect due to Maame-Quijano tear  Currently appears to be resolved  · NPO  · Continue IV fluids  · Continue IV Protonix  · Reglan p r n  · Repeat hemoglobin and hematocrit  · Consult Gastroenterology    Tachycardia  Assessment & Plan  In the ED, patient was having repeated bouts of tachycardia, at times with heart rate into the 150s  This was spontaneously resolved  He has lately been trending in the 100s  Suspect this is due to opioid withdrawal and stress from persistent hematemesis  · Continue telemetry monitoring for now  · EKG and troponin if experiencing chest pain or shortness of breath  · See treatment for opioid withdrawal as outlined  · Can try IV metoprolol or Cardizem if persistent    Leukocytosis  Assessment & Plan  Mild, noted on initial CBC  Suspect this is likely reactive due to withdrawal, nausea and vomiting  · Monitor for sign of infection  · Monitor temperature, white blood cell count  · See plan for opioid withdrawal and hematemesis as otherwise outlined    High anion gap metabolic acidosis  Assessment & Plan  · Will follow up VBG  · Continue isolyte hydration  · Monitor serum electrolytes  · Monitor for resolution    Opioid dependence with withdrawal [facility])  Assessment & Plan  Patient was receiving detox treatment at [facility] ([facility] 7)  In the ED, required Benadryl, Reglan, Ativan, and Thorazine  · Will consult toxicology and follow-up recommendations  · Monitor for continued sign of withdrawal  · Reglan p r n  · Ativan p r n      Tobacco dependence  Assessment & Plan   on abstinence  Nicotine patch while in the hospital        VTE Prophylaxis: Pharmacologic VTE Prophylaxis contraindicated due to acute upper gi bleed  / reason for no mechanical VTE prophylaxis low risk DVT   Code Status: Lvl 1 - Full Code   POLST: POLST form is not discussed and not completed at this time  Anticipated Length of Stay:  Patient will be admitted on an Observation basis with an anticipated length of stay of < 2 midnights  Justification for Hospital Stay: Please see detailed plans noted above  Chief Complaint:     Hematemesis    History of Present Illness:  Samy Pineda is a 22 y o  male who is presenting with vomiting up blood  Of note, on my current evaluation , patient is very lethargic and unable to give meaningful history  History is mainly obtained from EMR documentation  He has known history of opioid addiction, and was being treated at [facility] ([facility]) for detoxification  Yesterday evening, patient had acute onset of vomiting with abdominal pain precipitated by vomiting  As Center, he was being treated with Zofran and clonidine without improvement  In the ED, he had multiple bouts of vomiting and tachycardia  He was given Thorazine, Benadryl, 4 mg of Ativan, Reglan, IV fluids, Protonix  During his time in the ED, he also had multiple bouts of tachycardia, at times his heart rate climbing into the 140s to 150s  These episodes would spontaneously resolved  During this time, patient denied chest pain, abdominal pain, shortness of breath  Otherwise, workup in the ED revealed mild leukocytosis with increased anion gap metabolic acidosis  Other workup including troponin, CXR, were unremarkable  Patient was evaluated by critical care team who felt patient was stable for admission to the [facility] floor  Currently, patient is very lethargic, but he is able to be aroused  He offers no complaints at this time aside from being nauseous and feeling like he needs to vomit  Review of Systems   Unable to perform ROS: Mental status change   Gastrointestinal: Positive for nausea and vomiting  Negative for abdominal pain           Past Medical and Surgical History:   History reviewed  No pertinent past medical history  History reviewed  No pertinent surgical history  Meds/Allergies:    (Not in a hospital admission)    Allergies: No Known Allergies  History:  Marital Status: Single     Substance Use History:   Social History     Substance and Sexual Activity   Alcohol Use Not Currently     Social History     Tobacco Use   Smoking Status Current Every Day Smoker    Packs/day: 1 00    Types: Cigarettes   Smokeless Tobacco Never Used     Social History     Substance and Sexual Activity   Drug Use Yes    Comment: day three detox from fentanyl        Family History:  No family history on file  Physical Exam:     Vitals:   Blood Pressure: 111/64 (11/11/19 0600)  Pulse: 100 (11/11/19 0600)  Temperature: 99 4 °F (37 4 °C) (11/11/19 0150)  Temp Source: Axillary (11/11/19 0150)  Respirations: 16 (11/11/19 0600)  Height: 5' 10" (177 8 cm) (11/11/19 0150)  Weight - Scale: 65 3 kg (143 lb 15 4 oz) (11/11/19 0150)  SpO2: 95 % (11/11/19 0600)    Constitutional:  Well developed, well nourished, no acute distress, toxic appearance   Eyes:  PERRL, conjunctiva normal   HENT:  Atraumatic, external ears normal, nose normal, oropharynx moist, no pharyngeal exudates  Neck - normal range of motion, no tenderness, supple   Respiratory:  No respiratory distress  Normal breath sounds  No rales, no wheezing   Cardiovascular:  Tachycardia, normal rhythm, no murmurs, no gallops, no rubs   GI:  Soft, nondistended, normal bowel sounds, nontender, no organomegaly, no mass, no rebound, no guarding   :  No costovertebral angle tenderness  Musculoskeletal:  No edema, no tenderness, no deformities  Back - no tenderness  Integument:  Well hydrated, no rash   Lymphatic:  No lymphadenopathy noted   Neurologic:  Lethargic, communicative, CN 2-12 normal, normal sensory function, no focal deficits noted    Strength exam is somewhat limited due to poor cooperation, but otherwise no gross deficits noted  Psychiatric:  Speech and behavior appropriate       Lab Results: I have personally reviewed pertinent reports  Results from last 7 days   Lab Units 11/11/19  0156   WBC Thousand/uL 10 73*   HEMOGLOBIN g/dL 16 4   HEMATOCRIT % 47 5   PLATELETS Thousands/uL 286   NEUTROS PCT % 74   LYMPHS PCT % 20   MONOS PCT % 6   EOS PCT % 0     Results from last 7 days   Lab Units 11/11/19  0156   POTASSIUM mmol/L 3 5   CHLORIDE mmol/L 102   CO2 mmol/L 24   BUN mg/dL 16   CREATININE mg/dL 1 01   CALCIUM mg/dL 10 0   ALK PHOS U/L 70   ALT U/L 33   AST U/L 16           EKG: sinus tachycardia    Imaging: I have personally reviewed pertinent reports  and I have personally reviewed pertinent films in PACS    Xr Chest 2 Views    Result Date: 11/11/2019  Narrative: CHEST INDICATION:   hematemesis  COMPARISON:  None EXAM PERFORMED/VIEWS:  XR CHEST PA & LATERAL FINDINGS: Cardiomediastinal silhouette appears unremarkable  The lungs are clear  No pneumothorax or pleural effusion  Osseous structures appear within normal limits for patient age  Impression: No acute cardiopulmonary disease  Findings concur with the preliminary report by the referring clinician already in PACS and/or our electronic record EPIC  Workstation performed: [id number]         Portions of the record may have been created with voice recognition software  Occasional wrong word or "sound a like" substitutions may have occurred due to the inherent limitations of voice recognition software  Read the chart carefully and recognize, using context, where substitutions have occurred

## 2019-11-12 LAB
ALBUMIN SERPL BCP-MCNC: 3.8 G/DL (ref 3.5–5)
ALP SERPL-CCNC: 56 U/L (ref 46–116)
ALT SERPL W P-5'-P-CCNC: 35 U/L (ref 12–78)
ANION GAP SERPL CALCULATED.3IONS-SCNC: 12 MMOL/L (ref 4–13)
AST SERPL W P-5'-P-CCNC: 22 U/L (ref 5–45)
BILIRUB SERPL-MCNC: 0.9 MG/DL (ref 0.2–1)
BUN SERPL-MCNC: 14 MG/DL (ref 5–25)
CALCIUM SERPL-MCNC: 8.9 MG/DL (ref 8.3–10.1)
CHLORIDE SERPL-SCNC: 106 MMOL/L (ref 100–108)
CO2 SERPL-SCNC: 23 MMOL/L (ref 21–32)
CREAT SERPL-MCNC: 0.75 MG/DL (ref 0.6–1.3)
ERYTHROCYTE [DISTWIDTH] IN BLOOD BY AUTOMATED COUNT: 12.3 % (ref 11.6–15.1)
GFR SERPL CREATININE-BSD FRML MDRD: 129 ML/MIN/1.73SQ M
GLUCOSE P FAST SERPL-MCNC: 101 MG/DL (ref 65–99)
GLUCOSE SERPL-MCNC: 101 MG/DL (ref 65–140)
HCT VFR BLD AUTO: 40.4 % (ref 36.5–49.3)
HGB BLD-MCNC: 13.7 G/DL (ref 12–17)
MAGNESIUM SERPL-MCNC: 1.9 MG/DL (ref 1.6–2.6)
MCH RBC QN AUTO: 29.9 PG (ref 26.8–34.3)
MCHC RBC AUTO-ENTMCNC: 33.9 G/DL (ref 31.4–37.4)
MCV RBC AUTO: 88 FL (ref 82–98)
PHOSPHATE SERPL-MCNC: 2.4 MG/DL (ref 2.7–4.5)
PLATELET # BLD AUTO: 202 THOUSANDS/UL (ref 149–390)
PMV BLD AUTO: 9.2 FL (ref 8.9–12.7)
POTASSIUM SERPL-SCNC: 3.5 MMOL/L (ref 3.5–5.3)
PROT SERPL-MCNC: 6.4 G/DL (ref 6.4–8.2)
RBC # BLD AUTO: 4.58 MILLION/UL (ref 3.88–5.62)
SODIUM SERPL-SCNC: 141 MMOL/L (ref 136–145)
WBC # BLD AUTO: 9.38 THOUSAND/UL (ref 4.31–10.16)

## 2019-11-12 PROCEDURE — 85027 COMPLETE CBC AUTOMATED: CPT | Performed by: NURSE PRACTITIONER

## 2019-11-12 PROCEDURE — 99232 SBSQ HOSP IP/OBS MODERATE 35: CPT | Performed by: INTERNAL MEDICINE

## 2019-11-12 PROCEDURE — 84100 ASSAY OF PHOSPHORUS: CPT | Performed by: NURSE PRACTITIONER

## 2019-11-12 PROCEDURE — 99232 SBSQ HOSP IP/OBS MODERATE 35: CPT | Performed by: NURSE PRACTITIONER

## 2019-11-12 PROCEDURE — 99232 SBSQ HOSP IP/OBS MODERATE 35: CPT | Performed by: PHYSICIAN ASSISTANT

## 2019-11-12 PROCEDURE — 83735 ASSAY OF MAGNESIUM: CPT | Performed by: NURSE PRACTITIONER

## 2019-11-12 PROCEDURE — 80053 COMPREHEN METABOLIC PANEL: CPT | Performed by: NURSE PRACTITIONER

## 2019-11-12 RX ORDER — FAMOTIDINE 20 MG/1
20 TABLET, FILM COATED ORAL 2 TIMES DAILY
Status: DISCONTINUED | OUTPATIENT
Start: 2019-11-12 | End: 2019-11-12

## 2019-11-12 RX ORDER — PANTOPRAZOLE SODIUM 40 MG/1
40 TABLET, DELAYED RELEASE ORAL
Status: DISCONTINUED | OUTPATIENT
Start: 2019-11-12 | End: 2019-11-13 | Stop reason: HOSPADM

## 2019-11-12 RX ORDER — LORAZEPAM 1 MG/1
1 TABLET ORAL EVERY 4 HOURS PRN
Status: DISCONTINUED | OUTPATIENT
Start: 2019-11-12 | End: 2019-11-13 | Stop reason: HOSPADM

## 2019-11-12 RX ORDER — SODIUM CHLORIDE, SODIUM GLUCONATE, SODIUM ACETATE, POTASSIUM CHLORIDE, MAGNESIUM CHLORIDE, SODIUM PHOSPHATE, DIBASIC, AND POTASSIUM PHOSPHATE .53; .5; .37; .037; .03; .012; .00082 G/100ML; G/100ML; G/100ML; G/100ML; G/100ML; G/100ML; G/100ML
75 INJECTION, SOLUTION INTRAVENOUS CONTINUOUS
Status: DISCONTINUED | OUTPATIENT
Start: 2019-11-12 | End: 2019-11-12

## 2019-11-12 RX ORDER — SODIUM CHLORIDE, SODIUM LACTATE, POTASSIUM CHLORIDE, CALCIUM CHLORIDE 600; 310; 30; 20 MG/100ML; MG/100ML; MG/100ML; MG/100ML
100 INJECTION, SOLUTION INTRAVENOUS CONTINUOUS
Status: DISCONTINUED | OUTPATIENT
Start: 2019-11-12 | End: 2019-11-13 | Stop reason: HOSPADM

## 2019-11-12 RX ORDER — ONDANSETRON 2 MG/ML
4 INJECTION INTRAMUSCULAR; INTRAVENOUS EVERY 6 HOURS PRN
Status: DISCONTINUED | OUTPATIENT
Start: 2019-11-12 | End: 2019-11-13 | Stop reason: HOSPADM

## 2019-11-12 RX ORDER — SODIUM CHLORIDE 9 MG/ML
50 INJECTION, SOLUTION INTRAVENOUS CONTINUOUS
Status: DISCONTINUED | OUTPATIENT
Start: 2019-11-12 | End: 2019-11-12

## 2019-11-12 RX ORDER — CLONIDINE HYDROCHLORIDE 0.1 MG/1
0.1 TABLET ORAL EVERY 8 HOURS SCHEDULED
Status: DISCONTINUED | OUTPATIENT
Start: 2019-11-12 | End: 2019-11-13 | Stop reason: HOSPADM

## 2019-11-12 RX ORDER — CLONIDINE HYDROCHLORIDE 0.1 MG/1
0.1 TABLET ORAL EVERY 12 HOURS SCHEDULED
Status: DISCONTINUED | OUTPATIENT
Start: 2019-11-12 | End: 2019-11-12

## 2019-11-12 RX ADMIN — Medication 1 TABLET: at 12:13

## 2019-11-12 RX ADMIN — NICOTINE 1 PATCH: 21 PATCH TRANSDERMAL at 08:07

## 2019-11-12 RX ADMIN — SODIUM CHLORIDE, SODIUM LACTATE, POTASSIUM CHLORIDE, AND CALCIUM CHLORIDE 100 ML/HR: .6; .31; .03; .02 INJECTION, SOLUTION INTRAVENOUS at 15:38

## 2019-11-12 RX ADMIN — LORAZEPAM 1 MG: 2 INJECTION INTRAMUSCULAR; INTRAVENOUS at 08:08

## 2019-11-12 RX ADMIN — METOCLOPRAMIDE HYDROCHLORIDE 10 MG: 5 INJECTION INTRAMUSCULAR; INTRAVENOUS at 12:13

## 2019-11-12 RX ADMIN — LORAZEPAM 1 MG: 1 TABLET ORAL at 23:57

## 2019-11-12 RX ADMIN — PANTOPRAZOLE SODIUM 40 MG: 40 TABLET, DELAYED RELEASE ORAL at 16:36

## 2019-11-12 RX ADMIN — LORAZEPAM 1 MG: 2 INJECTION INTRAMUSCULAR; INTRAVENOUS at 03:01

## 2019-11-12 RX ADMIN — Medication 1 TABLET: at 22:32

## 2019-11-12 RX ADMIN — LORAZEPAM 1 MG: 1 TABLET ORAL at 19:19

## 2019-11-12 RX ADMIN — ONDANSETRON 4 MG: 2 INJECTION INTRAMUSCULAR; INTRAVENOUS at 19:19

## 2019-11-12 RX ADMIN — LORAZEPAM 1 MG: 1 TABLET ORAL at 13:26

## 2019-11-12 RX ADMIN — METOPROLOL TARTRATE 12.5 MG: 25 TABLET ORAL at 08:07

## 2019-11-12 RX ADMIN — CLONIDINE HYDROCHLORIDE 0.1 MG: 0.1 TABLET ORAL at 22:35

## 2019-11-12 RX ADMIN — FAMOTIDINE 20 MG: 10 INJECTION, SOLUTION INTRAVENOUS at 08:13

## 2019-11-12 RX ADMIN — CLONIDINE HYDROCHLORIDE 0.1 MG: 0.1 TABLET ORAL at 12:13

## 2019-11-12 RX ADMIN — ONDANSETRON 4 MG: 2 INJECTION INTRAMUSCULAR; INTRAVENOUS at 13:25

## 2019-11-12 RX ADMIN — METOCLOPRAMIDE HYDROCHLORIDE 10 MG: 5 INJECTION INTRAMUSCULAR; INTRAVENOUS at 02:56

## 2019-11-12 RX ADMIN — Medication 1 TABLET: at 16:36

## 2019-11-12 NOTE — ASSESSMENT & PLAN NOTE
Patient was receiving detox treatment at [facility] ([facility])  In the ED, required Benadryl, Reglan, Ativan, and Thorazine  · Monitor for continued sign of withdrawal  · Zofran p r n  · Ativan p r n  · Scheduled clonidine  · Patient verbalized desire to not be discharged back to the detox facility  Patient is not required to go back as he went there willingly and may be discharged home when medically stable

## 2019-11-12 NOTE — ASSESSMENT & PLAN NOTE
Mild, noted on initial CBC  Suspect this is likely reactive due to withdrawal, nausea and vomiting    · Afebrile   · WBC 9 38 this AM

## 2019-11-12 NOTE — PROGRESS NOTES
Cardiology Progress Note - J Luis Ugalde 22 y o  male MRN: [id number]    Unit/Bed#: -01 Encounter: [id number]      Assessment/Plan:  1- Atrial tachycardia versus sinus tachycardia  No episodes of SVT on telemetry  Likely secondary to opioid withdrawal  Continue with Lopressor 12 5 mg BID, may replace with clonidine for opioid withdrawal if indicated by [facility]  TTE showed EF 65%, without regional wall motion or valvular abnormalities  Continue to monitor telemetry  Subjective:   Patient seen and examined  No new complaints  HR settling down  Objective:     Vitals: Blood pressure 121/68, pulse 84, temperature 99 5 °F (37 5 °C), resp  rate 18, height 5' 10" (1 778 m), weight 65 3 kg (143 lb 15 4 oz), SpO2 93 %  , Body mass index is 20 66 kg/m² ,   Orthostatic Blood Pressures      Most Recent Value   Blood Pressure  121/68 filed at 11/12/2019 1656   Patient Position - Orthostatic VS  Lying filed at 11/11/2019 1716            Intake/Output Summary (Last 24 hours) at 11/12/2019 1744  Last data filed at 11/12/2019 1333  Gross per 24 hour   Intake 780 ml   Output --   Net 780 ml         Physical Exam:    GEN: J Luis Ugalde appears well, alert and oriented x 3, pleasant and cooperative   HEENT: pupils equal, round, and reactive to light; extraocular muscles intact  NECK: supple, no carotid bruits   HEART: regular rhythm, normal S1 and S2, no murmurs, clicks, gallops or rubs   LUNGS: clear to auscultation bilaterally; no wheezes, rales, or rhonchi   ABDOMEN: normal bowel sounds, soft, no tenderness, no distention  EXTREMITIES: peripheral pulses normal; no clubbing, cyanosis, or edema      Medications:      Current Facility-Administered Medications:     cloNIDine (CATAPRES) tablet 0 1 mg, 0 1 mg, Oral, Q8H [address], ISMAEL Black    lactated ringers infusion, 100 mL/hr, Intravenous, Continuous, ISMAEL Irizarry, Last Rate: 100 mL/hr at 11/12/19 1538, 100 mL/hr at 11/12/19 1538    LORazepam (ATIVAN) tablet 1 mg, 1 mg, Oral, Q4H PRN, ISMAEL Begum, 1 mg at 11/12/19 1326    metoprolol (LOPRESSOR) injection 5 mg, 5 mg, Intravenous, Q6H PRN, Hoda Alvarez PA-C, 5 mg at 11/11/19 0830    nicotine (NICODERM CQ) 21 mg/24 hr TD 24 hr patch 1 patch, 1 patch, Transdermal, Daily, Theodora Briones DO, 1 patch at 11/12/19 0807    ondansetron (ZOFRAN) injection 4 mg, 4 mg, Intravenous, Q6H PRN, ISMAEL Begum, 4 mg at 11/12/19 1325    pantoprazole (PROTONIX) EC tablet 40 mg, 40 mg, Oral, BID AC, Mila Reid PA-C, 40 mg at 11/12/19 1636    potassium-sodium phosphateS (K-PHOS,PHOSPHA 250) -250 mg tablet 1 tablet, 1 tablet, Oral, 4x Daily (with meals and at bedtime), ISMAEL Begum, 1 tablet at 11/12/19 1636     Labs & Results:    Results from last 7 days   Lab Units 11/11/19  1225 11/11/19  0946 11/11/19  0451   TROPONIN I ng/mL <0 02 <0 02 <0 02     Results from last 7 days   Lab Units 11/12/19  0532 11/11/19  0817 11/11/19  0156   WBC Thousand/uL 9 38  --  10 73*   HEMOGLOBIN g/dL 13 7 13 5 16 4   HEMATOCRIT % 40 4 39 1 47 5   PLATELETS Thousands/uL 202  --  286         Results from last 7 days   Lab Units 11/12/19  0532 11/11/19  0156   POTASSIUM mmol/L 3 5 3 5   CHLORIDE mmol/L 106 102   CO2 mmol/L 23 24   BUN mg/dL 14 16   CREATININE mg/dL 0 75 1 01   CALCIUM mg/dL 8 9 10 0   ALK PHOS U/L 56 70   ALT U/L 35 33   AST U/L 22 16         Results from last 7 days   Lab Units 11/12/19  0532 11/11/19  0156   MAGNESIUM mg/dL 1 9 1 9

## 2019-11-12 NOTE — PROGRESS NOTES
GI Progress Note - Raissa West 22 y o  male MRN: [id number]    Unit/Bed#: -01 Encounter: [id number]    Subjective: Kaitlyn Silverman reports still some upper abdominal pain and an episode of vomiting this morning, no blood noted  He wants to try something different than clear liquids  He feels he is still withdrawing  He reports blurry vision for several hours which he reports occurred to him in the past at another hospital when he was given 2 medications for withdrawal but he doesn't recall the names  He reports he also had difficulty moving his head or speaking at that time but that has not occurred this time  Objective:     Vitals: Blood pressure 126/82, pulse 88, temperature 99 1 °F (37 3 °C), resp  rate 17, height 5' 10" (1 778 m), weight 65 3 kg (143 lb 15 4 oz), SpO2 97 %  ,Body mass index is 20 66 kg/m²  Intake/Output Summary (Last 24 hours) at 11/12/2019 1202  Last data filed at 11/12/2019 0900  Gross per 24 hour   Intake 420 ml   Output --   Net 420 ml       Physical Exam:     General Appearance: Alert, oriented x3, no acute distress  Lungs: Clear to auscultation bilaterally, no rales or rhonchi, no labored breathing/accessory muscle use  Heart: Regular rate and rhythm, S1, S2 normal, no murmur, click, rub or gallop  Abdomen: Soft, non-tender, non-distended; bowel sounds normal; no masses or no organomegaly  Extremities: No cyanosis, edema    Invasive Devices     Peripheral Intravenous Line            Peripheral IV 11/11/19 Left Antecubital 1 day    Peripheral IV 11/11/19 Right Antecubital 1 day                Lab Results:  Results from last 7 days   Lab Units 11/12/19  0532  11/11/19  0156   WBC Thousand/uL 9 38  --  10 73*   HEMOGLOBIN g/dL 13 7   < > 16 4   HEMATOCRIT % 40 4   < > 47 5   PLATELETS Thousands/uL 202  --  286   NEUTROS PCT %  --   --  74   LYMPHS PCT %  --   --  20   MONOS PCT %  --   --  6   EOS PCT %  --   --  0    < > = values in this interval not displayed       Results from last 7 days   Lab Units 11/12/19  0532   POTASSIUM mmol/L 3 5   CHLORIDE mmol/L 106   CO2 mmol/L 23   BUN mg/dL 14   CREATININE mg/dL 0 75   CALCIUM mg/dL 8 9   ALK PHOS U/L 56   ALT U/L 35   AST U/L 22         Results from last 7 days   Lab Units 11/11/19  0156   LIPASE u/L 57*       Imaging Studies: I have personally reviewed pertinent imaging studies  Xr Chest 2 Views  Result Date: 11/11/2019  Impression: No acute cardiopulmonary disease  Assessment and Plan:     Opioid Withdrawal  Hematemesis  - Reports hematemesis prior to admission with no further episodes since, Hb stable at 13 7  - Suspect this was 2/2 esophagitis v possible MWT given her reports significant vomiting/retching in the setting of withdrawal  - Given his HD stability, no further episodes, normal Hb, and still going through, will defer EGD at this time   - Protonix 40 mg PO BID, pepcid will not be effective in treating any esophagitis or MWT  - Advance diet to full liquid, toast/crackers  - Serial abdominal exams  - Discussed with SLIM regarding blurry vision  - Pt will likely be stable for discharge from GI standpoint in the next 24 hours      The patient will be seen by Dr Ileana Aceves

## 2019-11-12 NOTE — PROGRESS NOTES
Progress Note - Romero Koyanagi 1995, 22 y o  male MRN: [id number]    Unit/Bed#: -01 Encounter: [id number]    Primary Care Provider: No primary care provider on file  Date and time admitted to hospital: 11/11/2019  1:41 AM        * Hematemesis  Assessment & Plan  Suspect due to esophagitis vs  Maame-Quijano tear  Currently appears to be resolved  · Full liquid diet with toast and crackers  · Encourage PO intake  · IVF -> LR @100 ml/hr  · Continue PO Protonix  · Zofran p r n   · Hgb stable at 13  · GI following  No need for repeat EGD as there are no more episodes of hematemesis  Opioid dependence with withdrawal [facility])  Assessment & Plan  Patient was receiving detox treatment at [facility]  In the ED, required Benadryl, Reglan, Ativan, and Thorazine  · Monitor for continued sign of withdrawal  · Zofran p r n  · Ativan p r n  · Scheduled clonidine  · Patient verbalized desire to not be discharged back to the detox facility  Patient is not required to go back as he went there willingly and may be discharged home when medically stable  Tobacco dependence  Assessment & Plan  Smoking cessation education  Nicotine patch while in the hospital    Tachycardia  Assessment & Plan  In the ED, patient was having repeated bouts of tachycardia, at times with heart rate into the 150s  This was spontaneously resolved  He has lately been trending in the 100s  Suspect this is due to opioid withdrawal and stress from persistent hematemesis  · Continue telemetry monitoring for now  · Increased scheduled Clonidine to 4 times a day for opoid withdrawal  · Discontinue PO metoprolol  If patient has another episode of PAT, re-evaluate for use of Beta Blocker    Leukocytosis  Assessment & Plan  Mild, noted on initial CBC  Suspect this is likely reactive due to withdrawal, nausea and vomiting    · Afebrile   · WBC 9 38 this AM      High anion gap metabolic acidosis  Assessment & Plan  · VBG this AM slightly alkalotic   · LR at 100 ml/hr until appetite improves  · Monitor serum electrolytes  · Anion gap 12      VTE Pharmacologic Prophylaxis:   Pharmacologic: Pharmacologic VTE Prophylaxis contraindicated due to hematemesis   Mechanical VTE Prophylaxis in Place: Yes    Patient Centered Rounds: I have performed bedside rounds with nursing staff today  Discussions with Specialists or Other Care Team Provider: nursing, CM    Education and Discussions with Family / Patient: I answered all questions to the best of my ability    Time Spent for Care: 20 minutes  More than 50% of total time spent on counseling and coordination of care as described above  Current Length of Stay: 0 day(s)    Current Patient Status: Inpatient   Certification Statement: The patient will continue to require additional inpatient hospital stay due to withdrawal symptoms, tolerating diet    Discharge Plan: Patient is not medically stable for discharge today  Likely in the next 24 hours pending progress    Code Status: Level 1 - Full Code      Subjective:   Patient seen lying comfortably in bed  Patient complains of nausea, vomiting, and double vision related to his withdrawal  He has little interest in eating  He verbalized wanting to leave today but was educated on the need to stay as he is not medically stable for discharge  Objective:     Vitals:   Temp (24hrs), Av 6 °F (37 °C), Min:98 1 °F (36 7 °C), Max:99 1 °F (37 3 °C)    Temp:  [98 1 °F (36 7 °C)-99 1 °F (37 3 °C)] 99 1 °F (37 3 °C)  HR:  [61-94] 88  Resp:  [15-20] 17  BP: (100-142)/(58-88) 126/82  SpO2:  [96 %-98 %] 97 %  Body mass index is 20 66 kg/m²  Input and Output Summary (last 24 hours): Intake/Output Summary (Last 24 hours) at 2019 1352  Last data filed at 2019 1333  Gross per 24 hour   Intake 780 ml   Output --   Net 780 ml       Physical Exam:     Physical Exam   Constitutional: He is oriented to person, place, and time  He appears well-developed   No distress  HENT:   Head: Normocephalic and atraumatic  Eyes: Pupils are equal, round, and reactive to light  Conjunctivae and EOM are normal    Complains of double vision   Neck: Normal range of motion  Cardiovascular: Regular rhythm, normal heart sounds and intact distal pulses  Tachycardia present  No murmur heard  Pulmonary/Chest: Effort normal and breath sounds normal    Abdominal: Soft  Bowel sounds are normal    Genitourinary:   Genitourinary Comments: Voids spontaneously   Musculoskeletal: Normal range of motion  He exhibits no edema  Neurological: He is alert and oriented to person, place, and time  Skin: He is not diaphoretic  There is pallor  Psychiatric: His mood appears anxious  He is withdrawn  Cognition and memory are normal  He expresses impulsivity  Nursing note and vitals reviewed  Additional Data:     Labs:    Results from last 7 days   Lab Units 11/12/19  0532  11/11/19  0156   WBC Thousand/uL 9 38  --  10 73*   HEMOGLOBIN g/dL 13 7   < > 16 4   HEMATOCRIT % 40 4   < > 47 5   PLATELETS Thousands/uL 202  --  286   NEUTROS PCT %  --   --  74   LYMPHS PCT %  --   --  20   MONOS PCT %  --   --  6   EOS PCT %  --   --  0    < > = values in this interval not displayed  Results from last 7 days   Lab Units 11/12/19  0532   POTASSIUM mmol/L 3 5   CHLORIDE mmol/L 106   CO2 mmol/L 23   BUN mg/dL 14   CREATININE mg/dL 0 75   CALCIUM mg/dL 8 9   ALK PHOS U/L 56   ALT U/L 35   AST U/L 22           * I Have Reviewed All Lab Data Listed Above  * Additional Pertinent Lab Tests Reviewed:  All Labs Within Last 24 Hours Reviewed    Imaging:    Imaging Reports Reviewed Today Include: CXR  Imaging Personally Reviewed by Myself Includes:  n/a  Recent Cultures (last 7 days):           Last 24 Hours Medication List:     Current Facility-Administered Medications:  cloNIDine 0 1 mg Oral [facility] Hero GuraboJULIO CESARNP   lactated ringers 100 mL/hr Intravenous Continuous Hero GuraboISMAEL LORazepam 1 mg Oral Q4H PRN ISMAEL Davila   metoprolol 5 mg Intravenous Q6H PRN Bonny López PA-C   nicotine 1 patch Transdermal Daily Peter Briones DO   ondansetron 4 mg Intravenous Q6H PRN ISMAEL Davila   pantoprazole 40 mg Oral BID SHIRLEY Reid PA-C   potassium-sodium phosphateS 1 tablet Oral 4x Daily (with meals and at bedtime) ISMAEL Davila        Today, Patient Was Seen By: ISMAEL Davila    ** Please Note: Dictation voice to text software may have been used in the creation of this document   **

## 2019-11-12 NOTE — SOCIAL WORK
CM met with pt at bedside and explained role  Pt reports he was admitted from [facility] in [facility]  Pt reports he was there for 3 days for opoid abuse prior to his admit to this facility  Pt reports he does not want to return to the facility on discharge as he did not like the treatment there  He does not want to go to any inpatient facility for detox/rehab  Pt reports he wants to return home with his parents on dischagre  CM encouraged pt to speak with his parents to discuss providing transport for him on discharge  Pt reports he lives with his parents in a 2 story house in [place]  He does not have a current PCP, infolink card provided for same  Pt deneis using a specific pharmacy  He denies any history of HHC, STR and MH treatment  Pt does report history of several inpatient detox rehab stays  Pt reports he does not want any follow up D&A services a this time  CM will continue to follow  CM reviewed d/c planning process including the following: identifying help at home, patient preference for d/c planning needs, availability of treatment team to discuss questions or concerns patient and/or family may have regarding understanding medications and recognizing signs and symptoms once discharged  CM also encouraged patient to follow up with all recommended appointments after discharge  Patient advised of importance for patient and family to participate in managing patients medical well being

## 2019-11-12 NOTE — ASSESSMENT & PLAN NOTE
Suspect due to esophagitis vs  Maame-Quijano tear  Currently appears to be resolved  · Full liquid diet with toast and crackers  · Encourage PO intake  · IVF -> LR @100 ml/hr  · Continue PO Protonix  · Zofran p r n   · Hgb stable at 13  · GI following  No need for repeat EGD as there are no more episodes of hematemesis

## 2019-11-12 NOTE — ASSESSMENT & PLAN NOTE
In the ED, patient was having repeated bouts of tachycardia, at times with heart rate into the 150s  This was spontaneously resolved  He has lately been trending in the 100s  Suspect this is due to opioid withdrawal and stress from persistent hematemesis  · Continue telemetry monitoring for now  · Increased scheduled Clonidine to 4 times a day for opoid withdrawal  · Discontinue PO metoprolol   If patient has another episode of PAT, re-evaluate for use of Beta Blocker

## 2019-11-12 NOTE — ASSESSMENT & PLAN NOTE
· VBG this AM slightly alkalotic   · LR at 100 ml/hr until appetite improves  · Monitor serum electrolytes  · Anion gap 12

## 2019-11-12 NOTE — UTILIZATION REVIEW
Initial Clinical Review    Admission: Date/Time/Statement:   OBS  11/11 0640 converted to IP on 11/12 @ 1321 for continued treatment and work up of hematemesis     Admitting Physician ISAAC MAX    Level of Care Med Surg    Estimated length of stay More than 2 Midnights    Certification I certify that inpatient services are medically necessary for this patient for a duration of greater than two midnights  See H&P and MD Progress Notes for additional information about the patient's course of treatment  ED Arrival Information     Expected Arrival Acuity Means of Arrival Escorted By Service Admission Type    - 11/11/2019 01:38 Urgent Wheelchair Friend General Medicine Urgent    Arrival Complaint    Alcohol Detox/Vomiting        Chief Complaint   Patient presents with    Vomiting     Vomitting since 1930 last night given 4mg zofran without relief, pt on day 3 detox of fentanyl from [place], pt repots vomitting blood, abd pain denies diarrhea  Assessment/Plan: 22 y male to ED from home w/ vomiting up blood  Pt lethargic and unable to give meaningful hx  Known hx of opoid addiction , being treated at [place] for detox  yest acute onset of vomiting and abd pain   Admitted OBS status w/ hematemesis plan to make NPO , cont IVF , IV protonix , reglan , H&H , consult GI    Tachycardia cont tele , will try iv cardizem if persists  Opoid dependence w/ withdraw consult toxicology ,monitor for withdraw , reglan and ativan prn       11/11 GI consult  Hematemesis hgb 13 5 , NPO , protonix , supportive care and antiemetics   11/11 Cardiology consult   Atrial tachycardia versus sinus tachycardia, suspect SVT on tele , likely reactive to opoid withdraw and persistent N/V , echo , lopressor  And monitor      ED Triage Vitals   Temperature Pulse Respirations Blood Pressure SpO2   11/11/19 0150 11/11/19 0150 11/11/19 0150 11/11/19 0150 11/11/19 0150   99 4 °F (37 4 °C) (!) 108 20 140/81 95 %      Temp Source Heart Rate Source Patient Position - Orthostatic VS BP Location FiO2 (%)   11/11/19 0150 11/11/19 0150 11/11/19 0200 11/11/19 0150 --   Axillary Monitor Sitting Left arm       Pain Score       11/11/19 0150       7        Wt Readings from Last 1 Encounters:   11/11/19 65 3 kg (143 lb 15 4 oz)     Additional Vital Signs:   11/12/19 07:13:21  99 1 °F (37 3 °C)  94  17  139/81  100  97 %  --  --   11/12/19 02:51:29  98 1 °F (36 7 °C)  92  20  127/83  98  98 %  --  --   11/12/19 0029  --  73  --  --  --  96 %  None (Room air)  --   11/11/19 23:44:45  98 8 °F (37 1 °C)  83  18  142/88  106  98 %  --  --   11/11/19 2118  --  88  --  --  --  97 %  None (Room air)  --   11/11/19 19:04:21  98 6 °F (37 °C)  85  --  113/58  76  96 %  --  --   11/11/19 17:16:48  98 6 °F (37 °C)  82  --  126/79  95  98 %  None (Room air)  Lying   11/11/19 1630  --  82  15  120/71  --  96 %  --  --   11/11/19 1600  --  86  20  112/63  --  96 %  --  --   11/11/19 1530  --  80  20  110/65  --  96 %  --  --   11/11/19 1500  --  81  17  100/58  --  96 %  --  --   11/11/19 1430  --  66  20  125/81  --  97 %  --  --   11/11/19 1400  --  76  16  118/75  --  96 %  --  --   11/11/19 1330  --  76  16  114/67  --  96 %  --  --   11/11/19 1230  --  88  17  117/75  --  99 %  --  --   11/11/19 1130  --  82  13  114/65  --  98 %  --  --   11/11/19 1126  --  86  16  106/56  --  98 %  --  Lying   11/11/19 1100  --  80  13  116/69  --  98 %  --  --   11/11/19 1030  --  77  15  112/67  --  98 %  --  --   11/11/19 1000  --  82  14  111/66  --  98 %  --  --   11/11/19 0930  --  80  11Abnormal   106/63  --  98 %  --  --   11/11/19 0900  --  80  12  113/69  --  99 %  --  --   11/11/19 0830  --  134Abnormal   11Abnormal   114/67  --  98 %  --  --   11/11/19 0822  98 3 °F (36 8 °C)  --  --  --  --  --  --  --   11/11/19 0800  --  163Abnormal   11Abnormal   117/60  --  98 %  --  --   11/11/19 0730  --  88  18  107/64  --  98 %  --  --   11/11/19 0630  --  102  15  119/64 --  98 %  None (Room air)  Sitting   11/11/19 0600  --  100  16  111/64  --  95 %  None (Room air)  Sitting   11/11/19 0530  --  97  17  107/75  --  97 %  None (Room air)  Lying   11/11/19 0500  --  99  19  114/63  --  97 %  None (Room air)  Lying   11/11/19 0430  --  92  19  114/66  --  97 %  None (Room air)  Lying   11/11/19 0330  --  73  20  124/78  --  97 %  None (Room air)  Sitting   11/11/19 0300  --  70  18  128/92  --  97 %  None (Room air)  Sitting   11/11/19 0230  --  78  18  131/81  --  97 %  None (Room air)  Sitting   11/11/19 0200  --  85  18  140/81  --  99 %  None (Room air         Pertinent Labs/Diagnostic Test Results:   11/11 CXR - No acute cardiopulmonary disease    Findings concur with the preliminary report by the referring clinician already in PACS and/or our electronic record Epic  11/11 EKG- NSR   11/11 Echo - EF 65 %   Results from last 7 days   Lab Units 11/12/19  0532 11/11/19  0817 11/11/19  0156   WBC Thousand/uL 9 38  --  10 73*   HEMOGLOBIN g/dL 13 7 13 5 16 4   HEMATOCRIT % 40 4 39 1 47 5   PLATELETS Thousands/uL 202  --  286   NEUTROS ABS Thousands/µL  --   --  7 83*     Results from last 7 days   Lab Units 11/12/19  0532 11/11/19  0156   SODIUM mmol/L 141 143   POTASSIUM mmol/L 3 5 3 5   CHLORIDE mmol/L 106 102   CO2 mmol/L 23 24   ANION GAP mmol/L 12 17*   BUN mg/dL 14 16   CREATININE mg/dL 0 75 1 01   EGFR ml/min/1 73sq m 129 104   CALCIUM mg/dL 8 9 10 0   MAGNESIUM mg/dL 1 9 1 9   PHOSPHORUS mg/dL 2 4* 2 2*     Results from last 7 days   Lab Units 11/12/19  0532 11/11/19  0156   AST U/L 22 16   ALT U/L 35 33   ALK PHOS U/L 56 70   TOTAL PROTEIN g/dL 6 4 8 0   ALBUMIN g/dL 3 8 4 8   TOTAL BILIRUBIN mg/dL 0 90 0 80     Results from last 7 days   Lab Units 11/12/19  0532 11/11/19  0156   GLUCOSE RANDOM mg/dL 101 151*     Results from last 7 days   Lab Units 11/11/19  0756   PH PROSPER  7 457*   PCO2 PROSPER mm Hg 35 4*   PO2 PROSPER mm Hg 96 5*   HCO3 PROSPER mmol/L 24 4   BASE EXC PROSPER mmol/L 1 0 O2 CONTENT PROSPER ml/dL 19 2   O2 HGB, VENOUS % 95 5*     Results from last 7 days   Lab Units 11/11/19  1225 11/11/19  0946 11/11/19  0451   TROPONIN I ng/mL <0 02 <0 02 <0 02     Results from last 7 days   Lab Units 11/11/19  0156   LIPASE u/L 57*     ED Treatment:   Medication Administration from 11/11/2019 0138 to 11/11/2019 1712       Date/Time Order Dose Route Action     11/11/2019 0203 sodium chloride 0 9 % bolus 1,000 mL 1,000 mL Intravenous New Bag     11/11/2019 0201 LORazepam (ATIVAN) 2 mg/mL injection 1 mg 1 mg Intravenous Given     11/11/2019 0203 metoclopramide (REGLAN) injection 10 mg 10 mg Intravenous Given     11/11/2019 0202 diphenhydrAMINE (BENADRYL) injection 25 mg 25 mg Intravenous Given     11/11/2019 0323 multi-electrolyte (ISOLYTE-S PH 7 4) bolus 1,000 mL 1,000 mL Intravenous New Bag     11/11/2019 0322 pantoprazole (PROTONIX) injection 40 mg 40 mg Intravenous Given     11/11/2019 0322 chlorproMAZINE (THORAZINE) injection SOLN 25 mg 25 mg Intramuscular Given     11/11/2019 0321 LORazepam (ATIVAN) 2 mg/mL injection 1 mg 1 mg Intravenous Given     11/11/2019 0611 multi-electrolyte (PLASMALYTE-A/ISOLYTE-S PH 7 4) IV solution 125 mL/hr Intravenous Restarted     11/11/2019 0452 multi-electrolyte (PLASMALYTE-A/ISOLYTE-S PH 7 4) IV solution 125 mL/hr Intravenous New Bag     11/11/2019 0618 multi-electrolyte (PLASMALYTE-A/ISOLYTE-S PH 7 4) IV solution 125 mL/hr Intravenous New Bag     11/11/2019 0813 nicotine (NICODERM CQ) 21 mg/24 hr TD 24 hr patch 1 patch 1 patch Transdermal Medication Applied     11/11/2019 0830 metoprolol (LOPRESSOR) injection 5 mg 5 mg Intravenous Given     11/11/2019 0841 multi-electrolyte (ISOLYTE-S PH 7 4) bolus 500 mL 500 mL Intravenous New Bag     11/11/2019 1133 metoprolol tartrate (LOPRESSOR) tablet 12 5 mg 12 5 mg Oral Given        History reviewed  No pertinent past medical history    Present on Admission:   Hematemesis   Opioid dependence with withdrawal (HCC)   High anion gap metabolic acidosis   Tobacco dependence   Leukocytosis   Tachycardia      Admitting Diagnosis: Hematemesis [K92 0]  SVT (supraventricular tachycardia) (HCC) [I47 1]  Vomiting [R11 10]  Narcotic withdrawal (HCC) [F11 23]  Opioid dependence with withdrawal ([facility] [address] ) [F11 23]  Age/Sex: 22 y o  male  Admission Orders:  Scheduled Medications:    Medications:  cloNIDine 0 1 mg Oral Q12H [address]   famotidine 20 mg Oral BID   metoprolol tartrate 12 5 mg Oral Q12H [address]   nicotine 1 patch Transdermal Daily   potassium-sodium phosphateS 1 tablet Oral 4x Daily (with meals and at bedtime)     Continuous IV Infusions:     PRN   Meds:    LORazepam 1 mg Oral Q4H PRN   metoclopramide 10 mg Intravenous Q6H PRN x1   metoprolol 5 mg Intravenous Q6H PRN x1    Zofran IV q6h prn   x1   Ativan IV q4h prn x1    NPO to Clear liq diet   Seizure ad aspiration precautions  Act as brandi  Tele     IP CONSULT TO GASTROENTEROLOGY  IP CONSULT TO CARDIOLOGY    Network Utilization Review Department  [email or web address]  ATTENTION: Please call with any questions or concerns to [phone] and carefully listen to the prompts so that you are directed to the right person  All voicemails are confidential   Ty Limb all requests for admission clinical reviews, approved or denied determinations and any other requests to dedicated fax number below belonging to the campus where the patient is receiving treatment    FACILITY NAME UR FAX NUMBER   ADMISSION DENIALS (Administrative/Medical Necessity) [phone]   PARENT CHILD HEALTH (Maternity/NICU/Pediatrics) [phone]   [facility] [place] [address] [place] [phone]   [facility] President [phone]   [facility] [address] [facility] [phone]

## 2019-11-13 VITALS
DIASTOLIC BLOOD PRESSURE: 79 MMHG | HEART RATE: 51 BPM | TEMPERATURE: 99 F | RESPIRATION RATE: 19 BRPM | BODY MASS INDEX: 20.61 KG/M2 | WEIGHT: 143.96 LBS | HEIGHT: 70 IN | OXYGEN SATURATION: 98 % | SYSTOLIC BLOOD PRESSURE: 132 MMHG

## 2019-11-13 PROBLEM — K92.0 HEMATEMESIS: Status: RESOLVED | Noted: 2019-11-11 | Resolved: 2019-11-13

## 2019-11-13 PROBLEM — D72.829 LEUKOCYTOSIS: Status: RESOLVED | Noted: 2019-11-11 | Resolved: 2019-11-13

## 2019-11-13 LAB
ANION GAP SERPL CALCULATED.3IONS-SCNC: 11 MMOL/L (ref 4–13)
BUN SERPL-MCNC: 12 MG/DL (ref 5–25)
CALCIUM SERPL-MCNC: 8.6 MG/DL (ref 8.3–10.1)
CHLORIDE SERPL-SCNC: 105 MMOL/L (ref 100–108)
CO2 SERPL-SCNC: 26 MMOL/L (ref 21–32)
CREAT SERPL-MCNC: 0.69 MG/DL (ref 0.6–1.3)
ERYTHROCYTE [DISTWIDTH] IN BLOOD BY AUTOMATED COUNT: 12.2 % (ref 11.6–15.1)
GFR SERPL CREATININE-BSD FRML MDRD: 134 ML/MIN/1.73SQ M
GLUCOSE SERPL-MCNC: 107 MG/DL (ref 65–140)
HCT VFR BLD AUTO: 38.2 % (ref 36.5–49.3)
HGB BLD-MCNC: 13.4 G/DL (ref 12–17)
MAGNESIUM SERPL-MCNC: 1.8 MG/DL (ref 1.6–2.6)
MCH RBC QN AUTO: 30.5 PG (ref 26.8–34.3)
MCHC RBC AUTO-ENTMCNC: 35.1 G/DL (ref 31.4–37.4)
MCV RBC AUTO: 87 FL (ref 82–98)
PHOSPHATE SERPL-MCNC: 4.2 MG/DL (ref 2.7–4.5)
PLATELET # BLD AUTO: 181 THOUSANDS/UL (ref 149–390)
PMV BLD AUTO: 9.3 FL (ref 8.9–12.7)
POTASSIUM SERPL-SCNC: 3.1 MMOL/L (ref 3.5–5.3)
RBC # BLD AUTO: 4.4 MILLION/UL (ref 3.88–5.62)
SODIUM SERPL-SCNC: 142 MMOL/L (ref 136–145)
WBC # BLD AUTO: 9.02 THOUSAND/UL (ref 4.31–10.16)

## 2019-11-13 PROCEDURE — 80048 BASIC METABOLIC PNL TOTAL CA: CPT | Performed by: NURSE PRACTITIONER

## 2019-11-13 PROCEDURE — 85027 COMPLETE CBC AUTOMATED: CPT | Performed by: NURSE PRACTITIONER

## 2019-11-13 PROCEDURE — 83735 ASSAY OF MAGNESIUM: CPT | Performed by: NURSE PRACTITIONER

## 2019-11-13 PROCEDURE — 99238 HOSP IP/OBS DSCHRG MGMT 30/<: CPT | Performed by: PHYSICIAN ASSISTANT

## 2019-11-13 PROCEDURE — 84100 ASSAY OF PHOSPHORUS: CPT | Performed by: NURSE PRACTITIONER

## 2019-11-13 RX ORDER — POTASSIUM CHLORIDE 20 MEQ/1
40 TABLET, EXTENDED RELEASE ORAL ONCE
Status: COMPLETED | OUTPATIENT
Start: 2019-11-13 | End: 2019-11-13

## 2019-11-13 RX ORDER — NICOTINE 21 MG/24HR
1 PATCH, TRANSDERMAL 24 HOURS TRANSDERMAL DAILY
Qty: 28 PATCH | Refills: 0
Start: 2019-11-14

## 2019-11-13 RX ORDER — HYDROXYZINE HYDROCHLORIDE 25 MG/1
25 TABLET, FILM COATED ORAL EVERY 6 HOURS PRN
Status: DISCONTINUED | OUTPATIENT
Start: 2019-11-13 | End: 2019-11-13 | Stop reason: HOSPADM

## 2019-11-13 RX ORDER — LANOLIN ALCOHOL/MO/W.PET/CERES
3 CREAM (GRAM) TOPICAL
Status: DISCONTINUED | OUTPATIENT
Start: 2019-11-13 | End: 2019-11-13 | Stop reason: HOSPADM

## 2019-11-13 RX ORDER — PANTOPRAZOLE SODIUM 40 MG/1
40 TABLET, DELAYED RELEASE ORAL
Refills: 0
Start: 2019-11-13 | End: 2019-12-13

## 2019-11-13 RX ORDER — CLONIDINE HYDROCHLORIDE 0.1 MG/1
0.1 TABLET ORAL EVERY 8 HOURS SCHEDULED
Refills: 0
Start: 2019-11-13

## 2019-11-13 RX ADMIN — POTASSIUM CHLORIDE 40 MEQ: 1500 TABLET, EXTENDED RELEASE ORAL at 09:39

## 2019-11-13 RX ADMIN — CLONIDINE HYDROCHLORIDE 0.1 MG: 0.1 TABLET ORAL at 06:03

## 2019-11-13 RX ADMIN — HYDROXYZINE HYDROCHLORIDE 25 MG: 25 TABLET ORAL at 01:29

## 2019-11-13 RX ADMIN — SODIUM CHLORIDE, SODIUM LACTATE, POTASSIUM CHLORIDE, AND CALCIUM CHLORIDE 100 ML/HR: .6; .31; .03; .02 INJECTION, SOLUTION INTRAVENOUS at 01:22

## 2019-11-13 RX ADMIN — LORAZEPAM 1 MG: 1 TABLET ORAL at 13:53

## 2019-11-13 RX ADMIN — ONDANSETRON 4 MG: 2 INJECTION INTRAMUSCULAR; INTRAVENOUS at 01:30

## 2019-11-13 RX ADMIN — CLONIDINE HYDROCHLORIDE 0.1 MG: 0.1 TABLET ORAL at 13:54

## 2019-11-13 RX ADMIN — PANTOPRAZOLE SODIUM 40 MG: 40 TABLET, DELAYED RELEASE ORAL at 06:03

## 2019-11-13 RX ADMIN — NICOTINE 1 PATCH: 21 PATCH TRANSDERMAL at 09:40

## 2019-11-13 RX ADMIN — HYDROXYZINE HYDROCHLORIDE 25 MG: 25 TABLET ORAL at 11:43

## 2019-11-13 RX ADMIN — MELATONIN 3 MG: 3 TAB ORAL at 01:29

## 2019-11-13 NOTE — DISCHARGE INSTRUCTIONS
Hematemesis   WHAT YOU NEED TO KNOW:   What is hematemesis? Hematemesis is the vomiting of blood  This is caused by bleeding in your upper gastrointestinal (GI) system  The blood may be bright red, or it may look like coffee grounds  Hematemesis is a medical emergency that needs immediate treatment  What causes hematemesis? · Tears in the lining of your stomach from retching    · Irritation or loss of the lining of your stomach or esophagus    · Bleeding from varices in your stomach or intestine    · A tumor in your stomach or esophagus    · Radiation or a procedure such as endoscopy that damages your upper GI    · A viral infection, hepatitis, or an H pylori infection    · A condition such as gastroenteritis, gastritis, or a stomach ulcer    · Use of medicines such as NSAIDs, aspirin, or blood thinners  How is the cause of hematemesis diagnosed? Your healthcare provider will ask about your symptoms  Tell him when the vomiting started and how long it lasted  Describe the amount of blood you vomited, and if it was bright red or looked like coffee grounds  Tell him about any recent illness you had, or if you have a chronic medical condition  Tell him if you recently took NSAIDs or aspirin, and how much you took  He may also ask if you drink alcohol regularly  · Blood tests  may be used to check your oxygen and iron levels  The tests can also show how well your blood clots  · Endoscopy  is a procedure used to examine your upper GI  Your healthcare provider will use a scope (thin, bendable tube with a light on the end)  He will move the scope down your throat and into your stomach  He may also take a tissue sample to be tested  · A bowel movement sample  may be tested for blood  · CT or x-ray pictures  may show the source of the bleeding  The pictures may show a tear, obstruction, or tumor that is causing you to vomit blood  How is hematemesis treated?   Treatment will depend on what is causing you to vomit blood  You may need any of the following:  · Medicine  may be given to reduce the amount of acid your stomach produces  This may help if your hematemesis is caused by an ulcer  You may also need medicine to prevent blood flow to an injury or tear  · Endoscopy  may be used to treat the cause of your bleeding  Your healthcare provider may use heat to close a tear  He may clip tissue together so it can heal      · A blood transfusion  may be needed if you lose a large amount of blood  · An angiogram  is done to look for and stop bleeding from an artery  Contrast liquid is injected into an artery and x-rays of your blood flow are taken  Tell a healthcare provider if you have ever had an allergic reaction to contrast liquid  · Surgery  may be needed if you have severe bleeding or other treatments do not work  Surgery may be used to fix a tear in the lining of your stomach or intestine  You may need surgery to remove an obstruction or a tumor  What can I do to manage my symptoms? · Do not take NSAIDs or aspirin  These medicines can cause stomach bleeding  Talk to your healthcare provider about other medicines that are safe for you to take  · Do not smoke  Nicotine can damage blood vessels  Talk to your healthcare provider if you need help quitting  E-cigarettes or smokeless tobacco still contain nicotine  Ask your healthcare provider for information before you use these products  · Do not drink alcohol or caffeine  Alcohol and caffeine can irritate your stomach  The lining of your stomach or intestine may also be damaged  Talk to your healthcare provider if you need help to quit drinking alcohol  · Eat a variety of healthy foods  Healthy foods include fruits, vegetables, low-fat dairy products, lean meats, fish, and legumes such as lentils  Healthy foods can help you heal and improve your energy  · Drink extra liquids as directed    You may need to drink extra liquids to prevent dehydration from vomiting  Ask your healthcare provider how much liquid to drink each day and which liquids are best for you  Call 911 for any of the following:   · You have signs of shock from blood loss, such as the following:     ¨ Feeling dizzy or faint, or breathing faster than usual    ¨ Pale, cool, clammy skin    ¨ A fast pulse, large pupils, or feeling anxious or agitated    ¨ Nausea or weakness    When should I seek immediate care? · You are vomiting large amounts of blood, or you vomit several times in a row  · You have severe pain in your abdomen  When should I contact my healthcare provider? · You have new or worsening symptoms  · You have questions or concerns about your condition or care  CARE AGREEMENT:   You have the right to help plan your care  Learn about your health condition and how it may be treated  Discuss treatment options with your caregivers to decide what care you want to receive  You always have the right to refuse treatment  The above information is an  only  It is not intended as medical advice for individual conditions or treatments  Talk to your doctor, nurse or pharmacist before following any medical regimen to see if it is safe and effective for you  © 2017 [address] [facility] Information is for End User's use only and may not be sold, redistributed or otherwise used for commercial purposes  All illustrations and images included in CareNotes® are the copyrighted property of [employer]  or [employer]  Atrial Tachycardia   WHAT YOU NEED TO KNOW:   Atrial tachycardia is a condition that causes your heart to beat more than 100 times each minute  Atrial tachycardia is also called supraventricular tachycardia  It can develop because of problems with your heart's electrical system   Any of the following may also put you at risk for atrial tachycardia:  · A heart condition, hypertension, or fatigue    · Anxiety, stress, or pain    · Large amounts of caffeine from coffee, tea, and energy drinks    · Heavy alcohol use or cigarette smoking    · Use of some medicines or street drugs  DISCHARGE INSTRUCTIONS:   Call 911 or have someone call if:  · You have any of the following signs of a heart attack:     ¨ Squeezing, pressure, or pain in your chest that lasts longer than a few minutes  Chest pain may come and go  ¨ Pain in your jaw, neck, one or both arms, upper and lower back, or stomach  ¨ Shortness of breath, or panting    ¨ Nausea or vomiting    ¨ Lightheadedness    · You cannot be woken  Contact your healthcare provider if:  · Your pulse is faster than your healthcare provider said it should be  · You have frequent periods of a fast heart rate  · You feel weak or dizzy  · You have questions or concerns about your condition or care  Medicines: You may  be given any of the following:  · Antiarrhythmia medicines  help keep your heartbeat in a regular rhythm  · Beta blockers  help slow your heartbeat and keep it in a regular rhythm  · Calcium channel blockers  help slow your heartbeat  · Blood thinners  help prevent blood clots  Clots can lead to stroke, heart attack, and death  Aspirin is a type of blood thinner  You may need to take an aspirin each day to help prevent blood clots  Do not take acetaminophen or ibuprofen instead  Do not take more or less aspirin than healthcare providers say to take  If you are on other blood thinner medicine, ask your healthcare provider before you take aspirin for any reason  · Take your medicine as directed  Contact your healthcare provider if you think your medicine is not helping or if you have side effects  Tell him or her if you are allergic to any medicine  Keep a list of the medicines, vitamins, and herbs you take  Include the amounts, and when and why you take them  Bring the list or the pill bottles to follow-up visits   Carry your medicine list with you in case of an emergency  Follow up with your healthcare provider or cardiologist as directed: You may need more tests  Write down your questions so you remember to ask them during your visits  Prevention and Management:   · Decrease the amount of caffeine you drink  Caffeine can increase your heart rate  · Limit or do not drink alcohol  Alcohol can increase your heart rate  Ask your healthcare provider if it is safe for you to drink alcohol  Also ask how much is safe for you to drink  · Do not smoke  Nicotine and other chemicals in cigarettes can cause damage to your heart  Ask your healthcare provider for information if you currently smoke and need help to quit  E-cigarettes or smokeless tobacco still contain nicotine  Talk to your healthcare provider before you use these products  · Do not use illegal drugs  Drugs such as meth and cocaine can increase your heart rate  Talk to your healthcare provider if you use illegal drugs and need help to quit  · Get more rest   Fatigue can cause your heart rate to increase  · Learn ways to cope with stress  Stress, fear, and anxiety can cause a fast heart rate  Your healthcare provider may recommend relaxation techniques and deep breathing exercises  Your healthcare provider may recommend you talk to someone about your stress or anxiety, such as a counselor or a trusted friend  Check your pulse as directed: Your healthcare provider will show you how to check your pulse, and how often to check it  Write down how fast your pulse is and if it feels regular or like it is skipping beats  Also write down the activity you were doing if your heart rate is above 100  Bring the information with you to your follow-up appointment  © 2017 [address] [employer] Information is for End User's use only and may not be sold, redistributed or otherwise used for commercial purposes   All illustrations and images included in CareNotes® are the copyrighted property of [employer]  or Rip Clayton  The above information is an  only  It is not intended as medical advice for individual conditions or treatments  Talk to your doctor, nurse or pharmacist before following any medical regimen to see if it is safe and effective for you  Opioid Withdrawal   WHAT YOU NEED TO KNOW:   Opioid withdrawal is a group of symptoms that occur when you suddenly decrease or stop taking opioids  Opioids include medicines to control pain, such as morphine and codeine, and illegal drugs, such as heroin  Withdrawal symptoms occur if you are physically dependent on opioids  Dependence means that your body gets used to how much medicine you take  This happens after you have used opioids regularly for a long time  Addiction means that a person uses opioids to get high instead of using them to control pain  DISCHARGE INSTRUCTIONS:   Medicines: You may  need any of the following:  · Opioid medicine  may still be needed if you have chronic pain  Your healthcare provider will tell you if you need to continue taking an opioid and explain how you should take it  · NSAIDs , such as ibuprofen, help decrease swelling, pain, and fever  This medicine is available with or without a doctor's order  NSAIDs can cause stomach bleeding or kidney problems in certain people  If you take blood thinner medicine, always ask your healthcare provider if NSAIDs are safe for you  Always read the medicine label and follow directions  · Blood pressure medicine  decreases symptoms of withdrawal, such as nausea, vomiting, muscle tension, and anxiety  · Antianxiety medicine  decreases anxiety and helps you feel calm and relaxed  · Antinausea medicine  helps calm your stomach and prevent vomiting  · Maintenance therapy medicine  is another type of opioid that replaces the opioid you are dependent on  · Take your medicine as directed    Contact your healthcare provider if you think your medicine is not helping or if you have side effects  Tell him or her if you are allergic to any medicine  Keep a list of the medicines, vitamins, and herbs you take  Include the amounts, and when and why you take them  Bring the list or the pill bottles to follow-up visits  Carry your medicine list with you in case of an emergency  Follow up with your healthcare provider as directed: You may need to return for other tests  You may also be referred to a specialty clinic to receive maintenance therapy medicine on a regular basis  Write down your questions so you remember to ask them during your visits  Psychological counseling and support:  Counseling and support may be provided to you if you are dependent on opioids  Healthcare providers will speak with you about your opioid use  They may also help you find resources for any daily living needs you have, such as housing or employment  Contact your healthcare provider if:   · You are about to run out of your opioid medicine  · You have nausea and vomiting  · You have questions or concerns about your condition or care  Seek care immediately or call 911 if:   · You have a fast heartbeat  · You have a seizure  © 2017 [address] [facility] Information is for End User's use only and may not be sold, redistributed or otherwise used for commercial purposes  All illustrations and images included in CareNotes® are the copyrighted property of [employer]  or Rip Clayton  The above information is an  only  It is not intended as medical advice for individual conditions or treatments  Talk to your doctor, nurse or pharmacist before following any medical regimen to see if it is safe and effective for you  Diet for Stomach Ulcers and Gastritis   WHAT YOU NEED TO KNOW:   What is a diet for stomach ulcers and gastritis? A diet for ulcers and gastritis is a meal plan that limits foods that irritate your stomach  Certain foods may worsen symptoms such as stomach pain, bloating, heartburn, or indigestion  Which foods should I limit or avoid? You may need to avoid acidic, spicy, or high-fat foods  Not all foods affect everyone the same way  You will need to learn which foods worsen your symptoms and limit those foods  The following are some foods that may worsen ulcer or gastritis symptoms:  · Beverages:      ¨ Whole milk and chocolate milk    ¨ Hot cocoa and cola    ¨ Any beverage with caffeine    ¨ Regular and decaffeinated coffee    ¨ Peppermint and spearmint tea    ¨ Green and black tea, with or without caffeine    ¨ Orange and grapefruit juices    ¨ Drinks that contain alcohol    · Spices and seasonings:      ¨ Black and red pepper    ¨ Chili powder    ¨ Mustard seed and nutmeg    · Other foods:      ¨ Dairy foods made from whole milk or cream    ¨ Chocolate    ¨ Spicy or strongly flavored cheeses, such as jalapeno or black pepper    ¨ Highly seasoned, high-fat meats, such as sausage, salami, sanchez, ham, and cold cuts    ¨ Hot chiles and peppers    ¨ Tomato products, such as tomato paste, tomato sauce, or tomato juice  Which foods can I eat and drink? Eat a variety of healthy foods from all the food groups  Eat fruits, vegetables, whole grains, and fat-free or low-fat dairy foods  Whole grains include whole-wheat breads, cereals, pasta, and brown rice  Choose lean meats, poultry (chicken and turkey), fish, beans, eggs, and nuts  A healthy meal plan is low in unhealthy fats, salt, and added sugar  Healthy fats include olive oil and canola oil  Ask your dietitian for more information about a healthy meal plan  What other guidelines may be helpful? · Do not eat right before bedtime  Stop eating at least 2 hours before bedtime  · Eat small, frequent meals  Your stomach may tolerate small, frequent meals better than large meals  CARE AGREEMENT:   You have the right to help plan your care   Discuss treatment options with your caregivers to decide what care you want to receive  You always have the right to refuse treatment  The above information is an  only  It is not intended as medical advice for individual conditions or treatments  Talk to your doctor, nurse or pharmacist before following any medical regimen to see if it is safe and effective for you  © 2017 [address] [employer] Information is for End User's use only and may not be sold, redistributed or otherwise used for commercial purposes  All illustrations and images included in CareNotes® are the copyrighted property of [employer]  or Rip Matilde  Pantoprazole (By mouth)   Pantoprazole (pan-TOE-pra-zole)  Treats gastroesophageal reflux disease (GERD), a damaged esophagus, and high levels of stomach acid  This medicine is a proton pump inhibitor (PPI)  Brand Name(s): Protonix   There may be other brand names for this medicine  When This Medicine Should Not Be Used: This medicine is not right for everyone  Do not use it if you had an allergic reaction to pantoprazole or similar medicines  How to Use This Medicine:   Packet, Tablet, Delayed Release Tablet, Long Acting Tablet  · Your doctor will tell you how much medicine to use  Do not use more than directed  Take the medicine at least 30 minutes before a meal   · Delayed-release tablet: Swallow the tablet whole  Do not crush, break, or chew it  · Delayed-release packet:   ¨ To prepare with applesauce:   § Mix the packet contents with 1 teaspoon of applesauce  Do not mix with water, or other liquids or food  Do not divide the packet contents to make smaller doses  § Swallow the mixture within 10 minutes after you mix it  Do not chew or crush the granules  § Sip some water after you take the mixture to make sure you swallow all of the medicine  ¨ To prepare with apple juice:   § Mix the packet contents with 1 teaspoon of apple juice in a small cup   Do not divide the packet contents to make smaller doses  § Stir for 5 seconds and drink the mixture immediately  Do not chew or crush the granules  § To make sure you get all of the medicine, add more apple juice to the cup  Drink it immediately  ¨ To prepare for a feeding tube:   § Pour the packet contents in a 2-ounce (60 milliliter [mL]) catheter-tip syringe  § Add 10 mL of apple juice to the syringe  Add the mixture to the tube  Gently tap or shake the barrel of the syringe to help empty it  § Add 10 mL of apple juice to the syringe and put it in the tube  Do this at least 2 times  There should be no granules left in the syringe  · This medicine should come with a Medication Guide  Ask your pharmacist for a copy if you do not have one  · Missed dose: Take a dose as soon as you remember  If it is almost time for your next dose, wait until then and take a regular dose  Do not take extra medicine to make up for a missed dose  · Store the medicine in a closed container at room temperature, away from heat, moisture, and direct light  Drugs and Foods to Avoid:   Ask your doctor or pharmacist before using any other medicine, including over-the-counter medicines, vitamins, and herbal products  · Some foods and medicines can affect how pantoprazole works  Tell your doctor if you are using any of the following:   ¨ Ampicillin, atazanavir, digoxin, erlotinib, ketoconazole, methotrexate, mycophenolate mofetil, nelfinavir  ¨ Blood thinner (including warfarin)  ¨ Diuretic (water pill)  ¨ Iron supplements  Warnings While Using This Medicine:   · Tell your doctor if you are pregnant or breastfeeding, or if you have liver disease, lupus, or osteoporosis  · This medicine may cause the following problems:  ¨ Kidney problems  ¨ Low vitamin B12 or magnesium levels  ¨ Increased risk of broken bones in the hip, wrist, or spine  · This medicine can cause diarrhea  Call your doctor if the diarrhea becomes severe, does not stop, or is bloody  Do not take any medicine to stop diarrhea until you have talked to your doctor  Diarrhea can occur 2 months or more after you stop taking this medicine  · Tell any doctor or dentist who treats you that you are using this medicine  This medicine may affect certain medical test results  · Your doctor will check your progress and the effects of this medicine at regular visits  Keep all appointments  · Keep all medicine out of the reach of children  Never share your medicine with anyone  Possible Side Effects While Using This Medicine:   Call your doctor right away if you notice any of these side effects:  · Allergic reaction: Itching or hives, swelling in your face or hands, swelling or tingling in your mouth or throat, chest tightness, trouble breathing  · Blistering, peeling, red skin rash  · Fever, joint pain, swelling in your body, unusual weight gain, change in how much or how often you urinate  · Joint pain, rash on your cheeks or arms that gets worse in the sun  · Seizures, dizziness, uneven heartbeat, muscle cramps or twitching  · Severe diarrhea, stomach cramps, fever  · Stomach pain, nausea, vomiting, weight loss  If you notice other side effects that you think are caused by this medicine, tell your doctor  Call your doctor for medical advice about side effects  You may report side effects to FDA at 6-305-FDA-6052  © 2017 [address] [employer] Information is for End User's use only and may not be sold, redistributed or otherwise used for commercial purposes  The above information is an  only  It is not intended as medical advice for individual conditions or treatments  Talk to your doctor, nurse or pharmacist before following any medical regimen to see if it is safe and effective for you  Clonidine (By mouth)   Clonidine (Zoë)  Treats high blood pressure  Also treats ADHD     Brand Name(s): Catapres, Kapvay   There may be other brand names for this medicine  When This Medicine Should Not Be Used: This medicine is not right for everyone  Do not use it if you had an allergic reaction to clonidine  How to Use This Medicine:   Long Acting Suspension, Tablet, Long Acting Tablet  · Take your medicine as directed  Your dose may need to be changed several times to find what works best for you  · Swallow the extended-release tablet whole  Do not crush, break, or chew it  · Clonidine extended-release tablets work differently than clonidine immediate-release tablets  Do not switch from the extended-release tablets to the immediate-release tablets unless your doctor tells you to  · Measure the oral liquid medicine with a marked measuring spoon, oral syringe, or medicine cup  Shake the bottle well before each use  · Read and follow the patient instructions that come with this medicine  Talk to your doctor or pharmacist if you have any questions  · Missed dose: Take a dose as soon as you remember  If it is almost time for your next dose, wait until then and take a regular dose  Do not take extra medicine to make up for a missed dose  If you miss more than 1 dose of clonidine tablets, check with your doctor right away  · Store the medicine in a closed container at room temperature, away from heat, moisture, and direct light  Drugs and Foods to Avoid:   Ask your doctor or pharmacist before using any other medicine, including over-the-counter medicines, vitamins, and herbal products  · Do not use this medicine together with other products that contain clonidine  · Some medicines can affect how clonidine works  Tell your doctor if you are taking depression medicine  · Tell your doctor if you use anything else that makes you sleepy  Some examples are allergy medicine, narcotic pain medicine, and alcohol    Warnings While Using This Medicine:   · Tell your doctor if you are pregnant, breastfeeding, or have kidney disease, heart or blood vessel disease, heart rhythm problems, stomach or bowel problems, or a history of heart attack or stroke  · This medicine may make you drowsy, dizzy, or lightheaded  Do not drive or do anything that could be dangerous until you know how this medicine affects you  · This medicine may cause dryness of the eyes  Talk to your doctor about how to treat the dryness  · Do not stop using this medicine suddenly  Your doctor will need to slowly decrease your dose before you stop it completely  · Tell any doctor or dentist who treats you that you are using this medicine  You may need to stop using this medicine several days before you have surgery or medical tests  · Even if you feel well, do not stop using the medicine without asking your doctor first  This medicine will not cure your high blood pressure, but it will help keep it in the normal range  You may have to take blood pressure medicine for the rest of your life  · Your doctor will check your progress and the effects of this medicine at regular visits  Keep all appointments  · Keep all medicine out of the reach of children  Never share your medicine with anyone  Possible Side Effects While Using This Medicine:   Call your doctor right away if you notice any of these side effects:  · Allergic reaction: Itching or hives, swelling in your face or hands, swelling or tingling in your mouth or throat, chest tightness, trouble breathing  · Fast, slow, pounding, or uneven heartbeat  · Lightheadedness, dizziness, fainting  · Swelling in your hands, ankles, or feet  · Unusual tiredness or weakness  If you notice these less serious side effects, talk with your doctor:   · Constipation, stomach pain  · Dry eyes, mouth, or throat  · Mild skin rash  · New or worsening headache  If you notice other side effects that you think are caused by this medicine, tell your doctor  Call your doctor for medical advice about side effects   You may report side effects to FDA at 7-171-FDA-6916  © 2017 [facility] [employer] is for End User's use only and may not be sold, redistributed or otherwise used for commercial purposes  The above information is an  only  It is not intended as medical advice for individual conditions or treatments  Talk to your doctor, nurse or pharmacist before following any medical regimen to see if it is safe and effective for you  Nicotine (Absorbed through the skin)   Nicotine (MAZIN-oh-teen)  Helps you quit smoking  Brand Name(s): [facility] Nicotine Transdermal System, [employer] Nicotine Transdermal System, [employer] Nicotine Transdermal System, Nicoderm CQ, Nicoderm CQ Clear, Nicotrol, [facility] [facility] Nicotine, [employer] Nicotine Transdermal System   There may be other brand names for this medicine  When This Medicine Should Not Be Used: This medicine is not right for everyone  Do not use it if you had an allergic reaction to nicotine  How to Use This Medicine:   Patch  · Follow the instructions on the medicine label if you are using this medicine without a prescription  · Read and follow the patient instructions that come with this medicine  Talk to your doctor or pharmacist if you have any questions  · Wash your hands with soap and water before and after applying a patch  · Leave the patch in its sealed wrapper until you are ready to put it on  Tear the wrapper open carefully  NEVER CUT the wrapper or the patch with scissors  Do not use any patch that has been cut by accident  · NicoDerm® CQ:   ¨ This is a 3-step program  If you smoke more than 10 cigarettes per day, start with step 1 followed by step 2 and step 3  If you smoke 10 or fewer cigarettes per day, start with step 2 followed by step 3  ¨ Begin using the patch on the morning of your quit day, even if you are not able to stop smoking immediately  ¨ Apply the patch at about the same time every day to skin that is clean, dry, and free of hair    ¨ The patient instructions will show the body areas where you can wear the patch  When putting on each new patch, choose a different place within these areas  Do not put the new patch on the same place you wore the last one  Be sure to remove the old patch before applying a new one  ¨ Do not put the patch over irritated skin  Do not use creams or lotions, including sunscreen, on the skin where you apply the patch, because it may not stick well  ¨ Apply a new patch if one falls off  ¨ If you have vivid dreams or sleep problems, remove the patch at bedtime and apply a new one in the morning  · Keep using this medicine for the full treatment time  If you feel you need to use this medicine for a longer period of time, talk to your doctor  · Store the patches at room temperature in a closed container, away from heat, moisture, and direct light  · Fold the used patch in half with the sticky sides together  Throw any used patch away so that children or pets cannot get to it  You will also need to throw away old patches after the expiration date has passed  Drugs and Foods to Avoid:      Ask your doctor or pharmacist before using any other medicine, including over-the-counter medicines, vitamins, and herbal products  Warnings While Using This Medicine:   · Pregnant or breastfeeding women should only use this medicine as directed by a doctor  Smoking can seriously harm your unborn child  Try to stop smoking without using medicine  Although this medicine is believed to be safer than smoking, the risks of its use during pregnancy are not fully known  · Tell your doctor if you have heart disease, heart rhythm problems, high blood pressure, or you had a recent heart attack  Tell your doctor if you have an allergy to adhesive tape  · This medicine may cause the following problems:  ¨ High blood pressure  ¨ Increase in heart rate  · The opaque NicoDerm® CQ patch may cause skin burns if you have a procedure called a magnetic resonance imaging (MRI) scan   You must remove the patch before an MRI  · Keep all medicine out of the reach of children  Never share your medicine with anyone  Possible Side Effects While Using This Medicine:   Call your doctor right away if you notice any of these side effects:  · Allergic reaction: Itching or hives, swelling in your face or hands, swelling or tingling in your mouth or throat, chest tightness, trouble breathing  · Dizziness, headache, upset stomach, drooling, vomiting, diarrhea, cold sweats, blurred vision, trouble hearing, confusion, fainting, or weakness  · Fast, slow, pounding, or uneven heartbeat  If you notice these less serious side effects, talk with your doctor:   · Mild skin redness, itching, burning, or tingling where you wear the patch  · Vivid dreams or trouble sleeping  If you notice other side effects that you think are caused by this medicine, tell your doctor  Call your doctor for medical advice about side effects  You may report side effects to FDA at 8-711-FDA-5490  © 2017 [address] [employer]  Information is for End User's use only and may not be sold, redistributed or otherwise used for commercial purposes  The above information is an  only  It is not intended as medical advice for individual conditions or treatments  Talk to your doctor, nurse or pharmacist before following any medical regimen to see if it is safe and effective for you  Pantoprazole (By mouth)   Pantoprazole (pan-TOE-pra-zole)  Treats gastroesophageal reflux disease (GERD), a damaged esophagus, and high levels of stomach acid  This medicine is a proton pump inhibitor (PPI)  Brand Name(s): Protonix   There may be other brand names for this medicine  When This Medicine Should Not Be Used: This medicine is not right for everyone  Do not use it if you had an allergic reaction to pantoprazole or similar medicines    How to Use This Medicine:   Packet, Tablet, Delayed Release Tablet, Long Acting Tablet  · Your doctor will tell you how much medicine to use  Do not use more than directed  Take the medicine at least 30 minutes before a meal   · Delayed-release tablet: Swallow the tablet whole  Do not crush, break, or chew it  · Delayed-release packet:   ¨ To prepare with applesauce:   § Mix the packet contents with 1 teaspoon of applesauce  Do not mix with water, or other liquids or food  Do not divide the packet contents to make smaller doses  § Swallow the mixture within 10 minutes after you mix it  Do not chew or crush the granules  § Sip some water after you take the mixture to make sure you swallow all of the medicine  ¨ To prepare with apple juice:   § Mix the packet contents with 1 teaspoon of apple juice in a small cup  Do not divide the packet contents to make smaller doses  § Stir for 5 seconds and drink the mixture immediately  Do not chew or crush the granules  § To make sure you get all of the medicine, add more apple juice to the cup  Drink it immediately  ¨ To prepare for a feeding tube:   § Pour the packet contents in a 2-ounce (60 milliliter [mL]) catheter-tip syringe  § Add 10 mL of apple juice to the syringe  Add the mixture to the tube  Gently tap or shake the barrel of the syringe to help empty it  § Add 10 mL of apple juice to the syringe and put it in the tube  Do this at least 2 times  There should be no granules left in the syringe  · This medicine should come with a Medication Guide  Ask your pharmacist for a copy if you do not have one  · Missed dose: Take a dose as soon as you remember  If it is almost time for your next dose, wait until then and take a regular dose  Do not take extra medicine to make up for a missed dose  · Store the medicine in a closed container at room temperature, away from heat, moisture, and direct light  Drugs and Foods to Avoid:   Ask your doctor or pharmacist before using any other medicine, including over-the-counter medicines, vitamins, and herbal products    · Some foods and medicines can affect how pantoprazole works  Tell your doctor if you are using any of the following:   ¨ Ampicillin, atazanavir, digoxin, erlotinib, ketoconazole, methotrexate, mycophenolate mofetil, nelfinavir  ¨ Blood thinner (including warfarin)  ¨ Diuretic (water pill)  ¨ Iron supplements  Warnings While Using This Medicine:   · Tell your doctor if you are pregnant or breastfeeding, or if you have liver disease, lupus, or osteoporosis  · This medicine may cause the following problems:  ¨ Kidney problems  ¨ Low vitamin B12 or magnesium levels  ¨ Increased risk of broken bones in the hip, wrist, or spine  · This medicine can cause diarrhea  Call your doctor if the diarrhea becomes severe, does not stop, or is bloody  Do not take any medicine to stop diarrhea until you have talked to your doctor  Diarrhea can occur 2 months or more after you stop taking this medicine  · Tell any doctor or dentist who treats you that you are using this medicine  This medicine may affect certain medical test results  · Your doctor will check your progress and the effects of this medicine at regular visits  Keep all appointments  · Keep all medicine out of the reach of children  Never share your medicine with anyone    Possible Side Effects While Using This Medicine:   Call your doctor right away if you notice any of these side effects:  · Allergic reaction: Itching or hives, swelling in your face or hands, swelling or tingling in your mouth or throat, chest tightness, trouble breathing  · Blistering, peeling, red skin rash  · Fever, joint pain, swelling in your body, unusual weight gain, change in how much or how often you urinate  · Joint pain, rash on your cheeks or arms that gets worse in the sun  · Seizures, dizziness, uneven heartbeat, muscle cramps or twitching  · Severe diarrhea, stomach cramps, fever  · Stomach pain, nausea, vomiting, weight loss  If you notice other side effects that you think are caused by this medicine, tell your doctor  Call your doctor for medical advice about side effects  You may report side effects to FDA at 9-565-FDA-1919  © 2017 [address] [employer] Information is for End User's use only and may not be sold, redistributed or otherwise used for commercial purposes  The above information is an  only  It is not intended as medical advice for individual conditions or treatments  Talk to your doctor, nurse or pharmacist before following any medical regimen to see if it is safe and effective for you

## 2019-11-13 NOTE — ASSESSMENT & PLAN NOTE
Patient was receiving detox treatment at [facility] ([facility])    In the ED, required Benadryl, Reglan, Ativan, and Thorazine  · Monitor for continued signs of withdrawal - started clonidine q 8 hr  · Today he is expressing desire to go back to [facility] ([facility] to complete detox/rehab

## 2019-11-13 NOTE — PROGRESS NOTES
Pt refusing to wear telemetry monitor, threatened to leave AMA  This RN educated pt on the reason telemetry ordered, pt continues to refuse  This RN explained to pt that it is in his best interest to stay the night in the hospital, pt states that he wants to leave and be picked up by rehab, because he needs his librium and subutex, asked RN to arrange for the rehab to pick him up at this moment  RN explained to the patient that it wouldn't be possible to arrange for pickup by the rehab at this time  SLIM conacted orders to follow

## 2019-11-13 NOTE — SOCIAL WORK
Late note: [facility] transport running late  Initially stated they would pick pt up at 1300  Transport then pushed back until [id number]  Pt becoming very anxious about leaving and asking about transport time  CM spoke with Vianney and Rosemarie Mcgarry in nursing at [place]  Per Beatrice Akhtar pt has called several times  They are aware he requires transport and they assure they will transport him back to [place] today  Beatrice Akhtar gives new  time of [phone]  Discussed with nursing and [facility]  Not appropriate to send pt back to facility via [employer]  Will await [facility] transport

## 2019-11-13 NOTE — ASSESSMENT & PLAN NOTE
· VBG actually showing alkalosis  · Anion gap improved from 17 to 12  · Patient refused CO2 monitoring, he is clinically and hemodynamically stable

## 2019-11-13 NOTE — ASSESSMENT & PLAN NOTE
In the ED, patient was having repeated bouts of tachycardia, at times with heart rate into the 150s  Suspected due to withdrawal  · Continue clonidine 0 1 q 8h  · Discontinued PO metoprolol   If patient has another episode of PAT, re-evaluate for use of Beta Blocker

## 2019-11-13 NOTE — ASSESSMENT & PLAN NOTE
Patient was receiving detox treatment at [facility] ([facility])    In the ED, required Benadryl, Reglan, Ativan, and Thorazine  · Monitor for continued signs of withdrawal - started clonidine q 8 hr  · Accepted back to rehab facility, stable to discharge from medical standpoint

## 2019-11-13 NOTE — SOCIAL WORK
Phone call received from [place] from [facility] reporting  will be picking patient up in 10 mins  Cm informed patient rn regarding this matter

## 2019-11-13 NOTE — NURSING NOTE
Notified BERTHA Ledesma patient verbalized they use librium and subutex at his drug rehabilitation center and it helps best with his withdrawal symptoms  Also notified Lui Pepe pt would like her to speak with him bedside  Liu Pepe met with pt at bedside and explained why those medications are not in use during this admission and which medications are in use to treat his current withdrawal sypmtoms  Patient verbalized understanding

## 2019-11-13 NOTE — NURSING NOTE
Patient has order for capnography however pt repeatedly removed the device activating the alarm  He is refusing to keep it on  Education provided however he continues to refuse  SLIM aware  Destinyim also contacted at pt request for medication for his restless legs

## 2019-11-13 NOTE — DISCHARGE SUMMARY
Discharge- Ira[facility] 1995, 22 y o  male MRN: [id number]    Unit/Bed#: -01 Encounter: [id number]    Primary Care Provider: No primary care provider on file  Date and time admitted to hospital: 11/11/2019  1:41 AM    * Hematemesis-resolved as of 11/13/2019  Assessment & Plan  Suspect due to esophagitis vs  Maame-Quijano tear  Currently appears to be resolved  · Tolerating diet well, no further hematemesis, no melena, hemoglobin stable  · Continue PO Protonix BID x 1 month, GI follow up outpatient   · No need for EGD as there are no more episodes of hematemesis    Opioid dependence with withdrawal [facility])  Assessment & Plan  Patient was receiving detox treatment at [facility]  In the ED, required Benadryl, Reglan, Ativan, and Thorazine  · Monitor for continued signs of withdrawal - started clonidine q 8 hr  · Accepted back to rehab facility, stable to discharge from medical standpoint    High anion gap metabolic acidosis  Assessment & Plan  · VBG actually showing alkalosis  · Anion gap improved from 17 to 12  · Patient refused CO2 monitoring, he is clinically and hemodynamically stable    Leukocytosis-resolved as of 11/13/2019  Assessment & Plan  Suspect this is likely reactive due to withdrawal, nausea and vomiting  Tobacco dependence  Assessment & Plan  · Smoking cessation education, nicotine patch recommended    Tachycardia  Assessment & Plan  · In the ED, patient was having repeated bouts of tachycardia, at times with heart rate into the 150s  Suspected due to withdrawal  · Continue clonidine 0 1 q 8h  · Discontinued PO metoprolol  If patient has another episode of PAT, can re-evaluate for use of Beta Blocker          Discharging Physician / Practitioner: Koki Schroeder PA-C  PCP: No primary care provider on file    Admission Date:   Admission Orders (From admission, onward)     Ordered        11/12/19 1321  Inpatient Admission  Once         11/11/19 0640  Place in Observation  Once 11/11/19 0641  Place in Observation  Once                   Discharge Date: 11/13/19    Resolved Problems  Date Reviewed: 11/13/2019          Resolved    * (Principal) Hematemesis 11/13/2019     Resolved by  Jayna Brand PA-C    Leukocytosis 11/13/2019     Resolved by  Jayna Brand PA-C          Consultations During Hospital Stay:  · Cardiology  · Gastroenterology    Procedures Performed:   · CXR  · Echo, preserved EF, right atrial mild dilation and right ventricle top normal size, mild IVC dilation    Significant Findings / Test Results:   · Hypokalemia on day of discharge, repleted by mouth  · Leukocytosis resolved  · Hypophosphatemia resolved    Incidental Findings:   ·  Echo results as above    Test Results Pending at Discharge (will require follow up):   ·      Outpatient Tests Requested:  · Return to rehab  · PCP follow-up upon discharge from rehab  · Pulmonology referral upon discharge from rehab, consider PFTs and ADRIAN testing  · Gastroenterology follow-up within 1 month    Complications:  Patient cooperation     Reason for Admission: hematemesis     Hospital Course:     Samy Pineda is a 22 y o  male patient who originally presented to the hospital on 11/11/2019 due to hematemesis  Patient was undergoing drug rehab at [facility] ([facility]), when he developed significant withdrawal symptoms and had an episode of hematemesis  They sent him to the ER for further evaluation  GI consult, suspected esophagitis versus Maame-Quijano tear, it was self-limiting and patient has remained hemodynamically stable without any further hematemesis  He has had no melena  EGD was not performed during this hospitalization, but will be considered outpatient especially if this recurs  At the present time GI feels the patient is stable for discharge back to rehab    Patient additionally was found to have tachycardia, there is question about supraventricular tachycardia however was found to be more likely paroxysmal atrial tachycardia  He was initially started on beta-blocker which was discontinued and replaced with clonidine to help with his withdrawal symptoms  He is doing well  At this time patient is medically stable for discharge back to drug rehab program     Please see above list of diagnoses and related plan for additional information  Condition at Discharge: good     Discharge Day Visit / Exam:     * Please refer to separate progress note for these details *    Discussion with Family: none present     Discharge instructions/Information to patient and family:   See after visit summary for information provided to patient and family  Provisions for Follow-Up Care:  See after visit summary for information related to follow-up care and any pertinent home health orders  Disposition:     [facility]/Drug rehab    Planned Readmission: none     Discharge Statement:  I spent approximately 20 minutes discharging the patient  This time was spent on the day of discharge  I had direct contact with the patient on the day of discharge  Greater than 50% of the total time was spent examining patient, answering all patient questions, arranging and discussing plan of care with patient as well as directly providing post-discharge instructions  Additional time then spent on discharge activities  Discharge Medications:  See after visit summary for reconciled discharge medications provided to patient and family        ** Please Note: This note has been constructed using a voice recognition system **

## 2019-11-13 NOTE — PLAN OF CARE
Problem: Potential for Falls  Goal: Patient will remain free of falls  Description  INTERVENTIONS:  - Assess patient frequently for physical needs  -  Identify cognitive and physical deficits and behaviors that affect risk of falls    -  [place] fall precautions as indicated by assessment   - Educate patient/family on patient safety including physical limitations  - Instruct patient to call for assistance with activity based on assessment  - Modify environment to reduce risk of injury  - Consider OT/PT consult to assist with strengthening/mobility  Outcome: Progressing     Problem: CARDIOVASCULAR - ADULT  Goal: Maintains optimal cardiac output and hemodynamic stability  Description  INTERVENTIONS:  - Monitor I/O, vital signs and rhythm  - Monitor for S/S and trends of decreased cardiac output  - Administer and titrate ordered vasoactive medications to optimize hemodynamic stability  - Assess quality of pulses, skin color and temperature  - Assess for signs of decreased coronary artery perfusion  - Instruct patient to report change in severity of symptoms  Outcome: Progressing  Goal: Absence of cardiac dysrhythmias or at baseline rhythm  Description  INTERVENTIONS:  - Continuous cardiac monitoring, vital signs, obtain 12 lead EKG if ordered  - Administer antiarrhythmic and heart rate control medications as ordered  - Monitor electrolytes and administer replacement therapy as ordered  Outcome: Progressing     Problem: GASTROINTESTINAL - ADULT  Goal: Minimal or absence of nausea and/or vomiting  Description  INTERVENTIONS:  - Administer IV fluids if ordered to ensure adequate hydration  - Maintain NPO status until nausea and vomiting are resolved  - Nasogastric tube if ordered  - Administer ordered antiemetic medications as needed  - Provide nonpharmacologic comfort measures as appropriate  - Advance diet as tolerated, if ordered  - Consider nutrition services referral to assist patient with adequate nutrition and appropriate food choices  Outcome: Progressing  Goal: Maintains adequate nutritional intake  Description  INTERVENTIONS:  - Monitor percentage of each meal consumed  - Identify factors contributing to decreased intake, treat as appropriate  - Assist with meals as needed  - Monitor I&O, weight, and lab values if indicated  - Obtain nutrition services referral as needed  Outcome: Progressing     Problem: METABOLIC, FLUID AND ELECTROLYTES - ADULT  Goal: Electrolytes maintained within normal limits  Description  INTERVENTIONS:  - Monitor labs and assess patient for signs and symptoms of electrolyte imbalances  - Administer electrolyte replacement as ordered  - Monitor response to electrolyte replacements, including repeat lab results as appropriate  - Instruct patient on fluid and nutrition as appropriate  Outcome: Progressing  Goal: Fluid balance maintained  Description  INTERVENTIONS:  - Monitor labs   - Monitor I/O and WT  - Instruct patient on fluid and nutrition as appropriate  - Assess for signs & symptoms of volume excess or deficit  Outcome: Progressing     Problem: SKIN/TISSUE INTEGRITY - ADULT  Goal: Skin integrity remains intact  Description  INTERVENTIONS  - Identify patients at risk for skin breakdown  - Assess and monitor skin integrity  - Assess and monitor nutrition and hydration status  - Monitor labs (i e  albumin)  - Assess for incontinence   - Turn and reposition patient  - Assist with mobility/ambulation  - Relieve pressure over bony prominences  - Avoid friction and shearing  - Provide appropriate hygiene as needed including keeping skin clean and dry  - Evaluate need for skin moisturizer/barrier cream  - Collaborate with interdisciplinary team (i e  Nutrition, Rehabilitation, etc )   - Patient/family teaching  Outcome: Progressing     Problem: PAIN - ADULT  Goal: Verbalizes/displays adequate comfort level or baseline comfort level  Description  Interventions:  - Encourage patient to monitor pain and request assistance  - Assess pain using appropriate pain scale  - Administer analgesics based on type and severity of pain and evaluate response  - Implement non-pharmacological measures as appropriate and evaluate response  - Consider cultural and social influences on pain and pain management  - Notify physician/advanced practitioner if interventions unsuccessful or patient reports new pain  Outcome: Progressing     Problem: INFECTION - ADULT  Goal: Absence or prevention of progression during hospitalization  Description  INTERVENTIONS:  - Assess and monitor for signs and symptoms of infection  - Monitor lab/diagnostic results  - Monitor all insertion sites, i e  indwelling lines, tubes, and drains  - Monitor endotracheal if appropriate and nasal secretions for changes in amount and color  - [place] appropriate cooling/warming therapies per order  - Administer medications as ordered  - Instruct and encourage patient and family to use good hand hygiene technique  - Identify and instruct in appropriate isolation precautions for identified infection/condition  Outcome: Progressing     Problem: SAFETY ADULT  Goal: Maintain or return to baseline ADL function  Description  INTERVENTIONS:  -  Assess patient's ability to carry out ADLs; assess patient's baseline for ADL function and identify physical deficits which impact ability to perform ADLs (bathing, care of mouth/teeth, toileting, grooming, dressing, etc )  - Assess/evaluate cause of self-care deficits   - Assess range of motion  - Assess patient's mobility; develop plan if impaired  - Assess patient's need for assistive devices and provide as appropriate  - Encourage maximum independence but intervene and supervise when necessary  - Involve family in performance of ADLs  - Assess for home care needs following discharge   - Consider OT consult to assist with ADL evaluation and planning for discharge  - Provide patient education as appropriate  Outcome: Progressing  Goal: Maintain or return mobility status to optimal level  Description  INTERVENTIONS:  - Assess patient's baseline mobility status (ambulation, transfers, stairs, etc )    - Identify cognitive and physical deficits and behaviors that affect mobility  - Identify mobility aids required to assist with transfers and/or ambulation (gait belt, sit-to-stand, lift, walker, cane, etc )  - [place] fall precautions as indicated by assessment  - Record patient progress and toleration of activity level on Mobility SBAR; progress patient to next Phase/Stage  - Instruct patient to call for assistance with activity based on assessment  - Consider rehabilitation consult to assist with strengthening/weightbearing, etc   Outcome: Progressing     Problem: DISCHARGE PLANNING  Goal: Discharge to home or other facility with appropriate resources  Description  INTERVENTIONS:  - Identify barriers to discharge w/patient and caregiver  - Arrange for needed discharge resources and transportation as appropriate  - Identify discharge learning needs (meds, wound care, etc )  - Arrange for interpretive services to assist at discharge as needed  - Refer to Case Management Department for coordinating discharge planning if the patient needs post-hospital services based on physician/advanced practitioner order or complex needs related to functional status, cognitive ability, or social support system  Outcome: Progressing     Problem: Knowledge Deficit  Goal: Patient/family/caregiver demonstrates understanding of disease process, treatment plan, medications, and discharge instructions  Description  Complete learning assessment and assess knowledge base    Interventions:  - Provide teaching at level of understanding  - Provide teaching via preferred learning methods  Outcome: Progressing

## 2019-11-13 NOTE — SOCIAL WORK
Per Vianney in nursing at [facility] ([facility]) they can accept pt back to the facility today  CM faxed disharge summary as requested  Vianney indicates they will send a  for pt at [phone] to return to facility  SLIM aware  Pt nurse also made aware  Pt aware of same and in agreement  CM to follow as needed

## 2019-11-13 NOTE — SOCIAL WORK
Pt medically stable for discharge per SLIM  Pt now requestes to return to [facility] ([facility]) to complete treatment  CM spoke with Vianney in nursing office of [facility] ([facility])  She requested medical records be faxed to review with their physician  They will contact CM to let use know if they can accept to back  If they can they will send a  to pick him up  Clinical faxed   Pt aware and in agerement

## 2019-11-13 NOTE — ASSESSMENT & PLAN NOTE
· In the ED, patient was having repeated bouts of tachycardia, at times with heart rate into the 150s  Suspected due to withdrawal  · Continue clonidine 0 1 q 8h  · Discontinued PO metoprolol   If patient has another episode of PAT, can re-evaluate for use of Beta Blocker

## 2019-11-13 NOTE — PROGRESS NOTES
Progress Note - Romero Koyanagi 1995, 22 y o  male MRN: [id number]    Unit/Bed#: -01 Encounter: [id number]    Primary Care Provider: No primary care provider on file  Date and time admitted to hospital: 11/11/2019  1:41 AM    * Hematemesis  Assessment & Plan  Suspect due to esophagitis vs  Maame-Quijano tear  Currently appears to be resolved  · Tolerating diet well, no further hematemesis, no melena, hemoglobin stable  · Continue PO Protonix BID x 1 month, GI follow up outpatient   · No need for EGD as there are no more episodes of hematemesis    Opioid dependence with withdrawal [facility])  Assessment & Plan  Patient was receiving detox treatment at [facility] ([facility])  In the ED, required Benadryl, Reglan, Ativan, and Thorazine  · Monitor for continued signs of withdrawal - started clonidine q 8 hr  · Today he is expressing desire to go back to [facility] ([facility]) to complete detox/rehab    High anion gap metabolic acidosis  Assessment & Plan  · VBG on admit slightly alkalotic   · Anion gap improved from 17 to 12    Leukocytosis  Assessment & Plan  Resolved  Suspect this is likely reactive due to withdrawal, nausea and vomiting  Tobacco dependence  Assessment & Plan  Smoking cessation education, nicotine patch while in the hospital    Tachycardia  Assessment & Plan  In the ED, patient was having repeated bouts of tachycardia, at times with heart rate into the 150s  Suspected due to withdrawal  · Continue clonidine 0 1 q 8h  · Discontinued PO metoprolol  If patient has another episode of PAT, re-evaluate for use of Beta Blocker        VTE Pharmacologic Prophylaxis:   Pharmacologic: Pharmacologic VTE Prophylaxis contraindicated due to GI bleed  Mechanical VTE Prophylaxis in Place: No ambulatory    Patient Centered Rounds: I have performed bedside rounds with nursing staff today      Discussions with Specialists or Other Care Team Provider:  GI, case management    Education and Discussions with Family / Patient:  Patient regarding rehab    Time Spent for Care: 20 minutes  More than 50% of total time spent on counseling and coordination of care as described above  Current Length of Stay: 1 day(s)    Current Patient Status: Inpatient   Certification Statement: The patient will continue to require additional inpatient hospital stay due to Case management work on placement back to regarding alcohol rehab    Discharge Plan:  Patient is medically stable to discharge back to rehab    Code Status: Level 1 - Full Code    Subjective:   Patient seen, he is still having some withdrawal specialty feeling anxious and tired  Denies any abdominal pain, nausea or vomiting, no melena  He is eating well without pain  Today he would like to go back to rehab  He denies CP or palpitations  He also denies suicidal intent/ideations  Objective:     Vitals:   Temp (24hrs), Av 6 °F (37 °C), Min:98 1 °F (36 7 °C), Max:99 5 °F (37 5 °C)    Temp:  [98 1 °F (36 7 °C)-99 5 °F (37 5 °C)] 98 1 °F (36 7 °C)  HR:  [48-88] 48  Resp:  [17-19] 19  BP: ()/(54-82) 126/72  SpO2:  [93 %-98 %] 97 %  Body mass index is 20 66 kg/m²  Input and Output Summary (last 24 hours): Intake/Output Summary (Last 24 hours) at 2019 0929  Last data filed at 2019 0121  Gross per 24 hour   Intake 1571 67 ml   Output --   Net 1571 67 ml       Physical Exam:     Physical Exam   Constitutional: Vital signs are normal  He appears well-developed and well-nourished  No distress  Cardiovascular: Normal rate, regular rhythm, S1 normal, S2 normal, normal heart sounds and intact distal pulses  No murmur heard  Pulmonary/Chest: Effort normal and breath sounds normal  No respiratory distress  He has no wheezes  He has no rhonchi  He has no rales  Abdominal: Soft  Bowel sounds are normal  He exhibits no distension  There is no tenderness  Musculoskeletal: He exhibits no edema  Psychiatric: His affect is blunt  He is withdrawn   Cognition and memory are normal    Nursing note and vitals reviewed  Additional Data:     Labs:    Results from last 7 days   Lab Units 11/13/19  0605  11/11/19  0156   WBC Thousand/uL 9 02   < > 10 73*   HEMOGLOBIN g/dL 13 4   < > 16 4   HEMATOCRIT % 38 2   < > 47 5   PLATELETS Thousands/uL 181   < > 286   NEUTROS PCT %  --   --  74   LYMPHS PCT %  --   --  20   MONOS PCT %  --   --  6   EOS PCT %  --   --  0    < > = values in this interval not displayed  Results from last 7 days   Lab Units 11/13/19  0605 11/12/19  0532   SODIUM mmol/L 142 141   POTASSIUM mmol/L 3 1* 3 5   CHLORIDE mmol/L 105 106   CO2 mmol/L 26 23   BUN mg/dL 12 14   CREATININE mg/dL 0 69 0 75   ANION GAP mmol/L 11 12   CALCIUM mg/dL 8 6 8 9   ALBUMIN g/dL  --  3 8   TOTAL BILIRUBIN mg/dL  --  0 90   ALK PHOS U/L  --  56   ALT U/L  --  35   AST U/L  --  22   GLUCOSE RANDOM mg/dL 107 101                         * I Have Reviewed All Lab Data Listed Above  * Additional Pertinent Lab Tests Reviewed:  All Labs Within Last 24 Hours Reviewed    Imaging:    Imaging Reports Reviewed Today Include:   Imaging Personally Reviewed by Myself Includes:      Recent Cultures (last 7 days):           Last 24 Hours Medication List:     Current Facility-Administered Medications:  cloNIDine 0 1 mg Oral [facility] ISMAEL Walton    hydrOXYzine HCL 25 mg Oral Q6H PRN Deonna CAMILO PA-C    lactated ringers 100 mL/hr Intravenous Continuous ISMAEL Walton Last Rate: 100 mL/hr (11/13/19 0122)   LORazepam 1 mg Oral Q4H PRN ISMAEL Walton    melatonin 3 mg Oral HS Deonna CAMILO PA-C    metoprolol 5 mg Intravenous Q6H PRN Osorio Mercado PA-C    nicotine 1 patch Transdermal Daily Avtar Briones DO    ondansetron 4 mg Intravenous Q6H PRN ISMAEL Walton    pantoprazole 40 mg Oral BID SHIRLEY Reid PA-C    potassium chloride 40 mEq Oral Once Osorio Mercado PA-C         Today, Patient Was Seen By: Osorio Mercado PA-C    ** Please Note: Dictation voice to text software may have been used in the creation of this document   **

## 2019-11-13 NOTE — ASSESSMENT & PLAN NOTE
Suspect due to esophagitis vs  Maame-Quijano tear  Currently appears to be resolved    · Tolerating diet well, no further hematemesis, no melena, hemoglobin stable  · Continue PO Protonix BID x 1 month, GI follow up outpatient   · No need for EGD as there are no more episodes of hematemesis

## 2019-11-14 NOTE — PLAN OF CARE
Problem: Potential for Falls  Goal: Patient will remain free of falls  Description  INTERVENTIONS:  - Assess patient frequently for physical needs  -  Identify cognitive and physical deficits and behaviors that affect risk of falls    -  [place] fall precautions as indicated by assessment   - Educate patient/family on patient safety including physical limitations  - Instruct patient to call for assistance with activity based on assessment  - Modify environment to reduce risk of injury  - Consider OT/PT consult to assist with strengthening/mobility  Outcome: Progressing     Problem: CARDIOVASCULAR - ADULT  Goal: Maintains optimal cardiac output and hemodynamic stability  Description  INTERVENTIONS:  - Monitor I/O, vital signs and rhythm  - Monitor for S/S and trends of decreased cardiac output  - Administer and titrate ordered vasoactive medications to optimize hemodynamic stability  - Assess quality of pulses, skin color and temperature  - Assess for signs of decreased coronary artery perfusion  - Instruct patient to report change in severity of symptoms  Outcome: Progressing  Goal: Absence of cardiac dysrhythmias or at baseline rhythm  Description  INTERVENTIONS:  - Continuous cardiac monitoring, vital signs, obtain 12 lead EKG if ordered  - Administer antiarrhythmic and heart rate control medications as ordered  - Monitor electrolytes and administer replacement therapy as ordered  Outcome: Progressing     Problem: GASTROINTESTINAL - ADULT  Goal: Minimal or absence of nausea and/or vomiting  Description  INTERVENTIONS:  - Administer IV fluids if ordered to ensure adequate hydration  - Maintain NPO status until nausea and vomiting are resolved  - Nasogastric tube if ordered  - Administer ordered antiemetic medications as needed  - Provide nonpharmacologic comfort measures as appropriate  - Advance diet as tolerated, if ordered  - Consider nutrition services referral to assist patient with adequate nutrition and appropriate food choices  Outcome: Progressing  Goal: Maintains adequate nutritional intake  Description  INTERVENTIONS:  - Monitor percentage of each meal consumed  - Identify factors contributing to decreased intake, treat as appropriate  - Assist with meals as needed  - Monitor I&O, weight, and lab values if indicated  - Obtain nutrition services referral as needed  Outcome: Progressing     Problem: METABOLIC, FLUID AND ELECTROLYTES - ADULT  Goal: Electrolytes maintained within normal limits  Description  INTERVENTIONS:  - Monitor labs and assess patient for signs and symptoms of electrolyte imbalances  - Administer electrolyte replacement as ordered  - Monitor response to electrolyte replacements, including repeat lab results as appropriate  - Instruct patient on fluid and nutrition as appropriate  Outcome: Progressing  Goal: Fluid balance maintained  Description  INTERVENTIONS:  - Monitor labs   - Monitor I/O and WT  - Instruct patient on fluid and nutrition as appropriate  - Assess for signs & symptoms of volume excess or deficit  Outcome: Progressing     Problem: SKIN/TISSUE INTEGRITY - ADULT  Goal: Skin integrity remains intact  Description  INTERVENTIONS  - Identify patients at risk for skin breakdown  - Assess and monitor skin integrity  - Assess and monitor nutrition and hydration status  - Monitor labs (i e  albumin)  - Assess for incontinence   - Turn and reposition patient  - Assist with mobility/ambulation  - Relieve pressure over bony prominences  - Avoid friction and shearing  - Provide appropriate hygiene as needed including keeping skin clean and dry  - Evaluate need for skin moisturizer/barrier cream  - Collaborate with interdisciplinary team (i e  Nutrition, Rehabilitation, etc )   - Patient/family teaching  Outcome: Progressing     Problem: PAIN - ADULT  Goal: Verbalizes/displays adequate comfort level or baseline comfort level  Description  Interventions:  - Encourage patient to monitor pain and request assistance  - Assess pain using appropriate pain scale  - Administer analgesics based on type and severity of pain and evaluate response  - Implement non-pharmacological measures as appropriate and evaluate response  - Consider cultural and social influences on pain and pain management  - Notify physician/advanced practitioner if interventions unsuccessful or patient reports new pain  Outcome: Progressing     Problem: INFECTION - ADULT  Goal: Absence or prevention of progression during hospitalization  Description  INTERVENTIONS:  - Assess and monitor for signs and symptoms of infection  - Monitor lab/diagnostic results  - Monitor all insertion sites, i e  indwelling lines, tubes, and drains  - Monitor endotracheal if appropriate and nasal secretions for changes in amount and color  - [place] appropriate cooling/warming therapies per order  - Administer medications as ordered  - Instruct and encourage patient and family to use good hand hygiene technique  - Identify and instruct in appropriate isolation precautions for identified infection/condition  Outcome: Progressing     Problem: SAFETY ADULT  Goal: Maintain or return to baseline ADL function  Description  INTERVENTIONS:  -  Assess patient's ability to carry out ADLs; assess patient's baseline for ADL function and identify physical deficits which impact ability to perform ADLs (bathing, care of mouth/teeth, toileting, grooming, dressing, etc )  - Assess/evaluate cause of self-care deficits   - Assess range of motion  - Assess patient's mobility; develop plan if impaired  - Assess patient's need for assistive devices and provide as appropriate  - Encourage maximum independence but intervene and supervise when necessary  - Involve family in performance of ADLs  - Assess for home care needs following discharge   - Consider OT consult to assist with ADL evaluation and planning for discharge  - Provide patient education as appropriate  Outcome: Progressing  Goal: Maintain or return mobility status to optimal level  Description  INTERVENTIONS:  - Assess patient's baseline mobility status (ambulation, transfers, stairs, etc )    - Identify cognitive and physical deficits and behaviors that affect mobility  - Identify mobility aids required to assist with transfers and/or ambulation (gait belt, sit-to-stand, lift, walker, cane, etc )  - [place] fall precautions as indicated by assessment  - Record patient progress and toleration of activity level on Mobility SBAR; progress patient to next Phase/Stage  - Instruct patient to call for assistance with activity based on assessment  - Consider rehabilitation consult to assist with strengthening/weightbearing, etc   Outcome: Progressing     Problem: DISCHARGE PLANNING  Goal: Discharge to home or other facility with appropriate resources  Description  INTERVENTIONS:  - Identify barriers to discharge w/patient and caregiver  - Arrange for needed discharge resources and transportation as appropriate  - Identify discharge learning needs (meds, wound care, etc )  - Arrange for interpretive services to assist at discharge as needed  - Refer to Case Management Department for coordinating discharge planning if the patient needs post-hospital services based on physician/advanced practitioner order or complex needs related to functional status, cognitive ability, or social support system  Outcome: Progressing     Problem: Knowledge Deficit  Goal: Patient/family/caregiver demonstrates understanding of disease process, treatment plan, medications, and discharge instructions  Description  Complete learning assessment and assess knowledge base    Interventions:  - Provide teaching at level of understanding  - Provide teaching via preferred learning methods  Outcome: Progressing

## 2019-11-14 NOTE — UTILIZATION REVIEW
Notification of Discharge  This is a Notification of Discharge from our facility [address]  Please be advised that this patient has been discharge from our facility  Below you will find the admission and discharge date and time including the patients disposition  PRESENTATION DATE: 11/11/2019  1:41 AM  OBS ADMISSION DATE: 11/11/2019  IP ADMISSION DATE: 11/12/19 1321   DISCHARGE DATE: 11/13/2019  5:07 PM  DISPOSITION: Non [facility]   Admission Orders listed below:  Admission Orders (From admission, onward)     Ordered        11/12/19 1321  Inpatient Admission  Once         11/11/19 0640  Place in Observation  Once         11/11/19 0641  Place in Observation  Once                   Please contact the UR Department if additional information is required to close this patient's authorization/case  [facility] Utilization Review Department  Main: [phone] x carefully listen to the prompts  All voicemails are confidential   [email or web address]  Send all requests for admission clinical reviews, approved or denied determinations and any other requests to dedicated fax number below belonging to the campus where the patient is receiving treatment    List of dedicated fax numbers:  [address] [facility] [address] DENIALS (Administrative/Medical Necessity) [phone]   [address] (Maternity/NICU/Pediatrics) [phone]   Jasvir Hensley [phone]   Agueda Cason [phone]   [facility] [phone]   Mandy Saunders [facility] [address] [facility] [phone]   Sonu Shape [address] [place] [address] [address] [phone]

## 2019-11-14 NOTE — UTILIZATION REVIEW
Notification of Discharge  This is a Notification of Discharge from our facility [address]  Please be advised that this patient has been discharge from our facility  Below you will find the admission and discharge date and time including the patients disposition  PRESENTATION DATE: 11/11/2019  1:41 AM  OBS ADMISSION DATE: 11/11/2019  IP ADMISSION DATE: 11/12/19 1321   DISCHARGE DATE: 11/13/2019  5:07 PM  DISPOSITION: [facility]   Admission Orders listed below:  Admission Orders (From admission, onward)     Ordered        11/12/19 1321  Inpatient Admission  Once         11/11/19 0640  Place in Observation  Once         11/11/19 0641  Place in Observation  Once                   Please contact the UR Department if additional information is required to close this patient's authorization/case  [address] [facility] Utilization Review Department  Main: [phone] x carefully listen to the prompts  All voicemails are confidential   [email or web address]  Send all requests for admission clinical reviews, approved or denied determinations and any other requests to dedicated fax number below belonging to the campus where the patient is receiving treatment    List of dedicated fax numbers:  [address] [place] [address] DENIALS (Administrative/Medical Necessity) [phone]   [address]  (Maternity/NICU/Pediatrics) [phone]   Geoff Johnson [phone]   Jessica Kelley [phone]   [facility] [phone]   Shahla Burnham [facility] [address] [facility] [phone]   [facility] [address] [place] [phone]

## 2024-01-02 NOTE — PLAN OF CARE
Problem: Potential for Falls  Goal: Patient will remain free of falls  Description  INTERVENTIONS:  - Assess patient frequently for physical needs  -  Identify cognitive and physical deficits and behaviors that affect risk of falls    -  [place] fall precautions as indicated by assessment   - Educate patient/family on patient safety including physical limitations  - Instruct patient to call for assistance with activity based on assessment  - Modify environment to reduce risk of injury  - Consider OT/PT consult to assist with strengthening/mobility  Outcome: Progressing     Problem: CARDIOVASCULAR - ADULT  Goal: Maintains optimal cardiac output and hemodynamic stability  Description  INTERVENTIONS:  - Monitor I/O, vital signs and rhythm  - Monitor for S/S and trends of decreased cardiac output  - Administer and titrate ordered vasoactive medications to optimize hemodynamic stability  - Assess quality of pulses, skin color and temperature  - Assess for signs of decreased coronary artery perfusion  - Instruct patient to report change in severity of symptoms  Outcome: Progressing  Goal: Absence of cardiac dysrhythmias or at baseline rhythm  Description  INTERVENTIONS:  - Continuous cardiac monitoring, vital signs, obtain 12 lead EKG if ordered  - Administer antiarrhythmic and heart rate control medications as ordered  - Monitor electrolytes and administer replacement therapy as ordered  Outcome: Progressing     Problem: GASTROINTESTINAL - ADULT  Goal: Minimal or absence of nausea and/or vomiting  Description  INTERVENTIONS:  - Administer IV fluids if ordered to ensure adequate hydration  - Maintain NPO status until nausea and vomiting are resolved  - Nasogastric tube if ordered  - Administer ordered antiemetic medications as needed  - Provide nonpharmacologic comfort measures as appropriate  - Advance diet as tolerated, if ordered  - Consider nutrition services referral to assist patient with adequate nutrition and appropriate food choices  Outcome: Progressing  Goal: Maintains adequate nutritional intake  Description  INTERVENTIONS:  - Monitor percentage of each meal consumed  - Identify factors contributing to decreased intake, treat as appropriate  - Assist with meals as needed  - Monitor I&O, weight, and lab values if indicated  - Obtain nutrition services referral as needed  Outcome: Progressing     Problem: METABOLIC, FLUID AND ELECTROLYTES - ADULT  Goal: Electrolytes maintained within normal limits  Description  INTERVENTIONS:  - Monitor labs and assess patient for signs and symptoms of electrolyte imbalances  - Administer electrolyte replacement as ordered  - Monitor response to electrolyte replacements, including repeat lab results as appropriate  - Instruct patient on fluid and nutrition as appropriate  Outcome: Progressing  Goal: Fluid balance maintained  Description  INTERVENTIONS:  - Monitor labs   - Monitor I/O and WT  - Instruct patient on fluid and nutrition as appropriate  - Assess for signs & symptoms of volume excess or deficit  Outcome: Progressing     Problem: SKIN/TISSUE INTEGRITY - ADULT  Goal: Skin integrity remains intact  Description  INTERVENTIONS  - Identify patients at risk for skin breakdown  - Assess and monitor skin integrity  - Assess and monitor nutrition and hydration status  - Monitor labs (i e  albumin)  - Assess for incontinence   - Turn and reposition patient  - Assist with mobility/ambulation  - Relieve pressure over bony prominences  - Avoid friction and shearing  - Provide appropriate hygiene as needed including keeping skin clean and dry  - Evaluate need for skin moisturizer/barrier cream  - Collaborate with interdisciplinary team (i e  Nutrition, Rehabilitation, etc )   - Patient/family teaching  Outcome: Progressing     Problem: PAIN - ADULT  Goal: Verbalizes/displays adequate comfort level or baseline comfort level  Description  Interventions:  - Encourage patient to monitor pain and request assistance  - Assess pain using appropriate pain scale  - Administer analgesics based on type and severity of pain and evaluate response  - Implement non-pharmacological measures as appropriate and evaluate response  - Consider cultural and social influences on pain and pain management  - Notify physician/advanced practitioner if interventions unsuccessful or patient reports new pain  Outcome: Progressing     Problem: INFECTION - ADULT  Goal: Absence or prevention of progression during hospitalization  Description  INTERVENTIONS:  - Assess and monitor for signs and symptoms of infection  - Monitor lab/diagnostic results  - Monitor all insertion sites, i e  indwelling lines, tubes, and drains  - Monitor endotracheal if appropriate and nasal secretions for changes in amount and color  - [place] appropriate cooling/warming therapies per order  - Administer medications as ordered  - Instruct and encourage patient and family to use good hand hygiene technique  - Identify and instruct in appropriate isolation precautions for identified infection/condition  Outcome: Progressing     Problem: SAFETY ADULT  Goal: Maintain or return to baseline ADL function  Description  INTERVENTIONS:  -  Assess patient's ability to carry out ADLs; assess patient's baseline for ADL function and identify physical deficits which impact ability to perform ADLs (bathing, care of mouth/teeth, toileting, grooming, dressing, etc )  - Assess/evaluate cause of self-care deficits   - Assess range of motion  - Assess patient's mobility; develop plan if impaired  - Assess patient's need for assistive devices and provide as appropriate  - Encourage maximum independence but intervene and supervise when necessary  - Involve family in performance of ADLs  - Assess for home care needs following discharge   - Consider OT consult to assist with ADL evaluation and planning for discharge  - Provide patient education as appropriate  Outcome: Progressing  Goal: Maintain or return mobility status to optimal level  Description  INTERVENTIONS:  - Assess patient's baseline mobility status (ambulation, transfers, stairs, etc )    - Identify cognitive and physical deficits and behaviors that affect mobility  - Identify mobility aids required to assist with transfers and/or ambulation (gait belt, sit-to-stand, lift, walker, cane, etc )  - [place] fall precautions as indicated by assessment  - Record patient progress and toleration of activity level on Mobility SBAR; progress patient to next Phase/Stage  - Instruct patient to call for assistance with activity based on assessment  - Consider rehabilitation consult to assist with strengthening/weightbearing, etc   Outcome: Progressing     Problem: DISCHARGE PLANNING  Goal: Discharge to home or other facility with appropriate resources  Description  INTERVENTIONS:  - Identify barriers to discharge w/patient and caregiver  - Arrange for needed discharge resources and transportation as appropriate  - Identify discharge learning needs (meds, wound care, etc )  - Arrange for interpretive services to assist at discharge as needed  - Refer to Case Management Department for coordinating discharge planning if the patient needs post-hospital services based on physician/advanced practitioner order or complex needs related to functional status, cognitive ability, or social support system  Outcome: Progressing     Problem: Knowledge Deficit  Goal: Patient/family/caregiver demonstrates understanding of disease process, treatment plan, medications, and discharge instructions  Description  Complete learning assessment and assess knowledge base    Interventions:  - Provide teaching at level of understanding  - Provide teaching via preferred learning methods  Outcome: Progressing No